# Patient Record
Sex: MALE | Race: WHITE | Employment: FULL TIME | URBAN - METROPOLITAN AREA
[De-identification: names, ages, dates, MRNs, and addresses within clinical notes are randomized per-mention and may not be internally consistent; named-entity substitution may affect disease eponyms.]

---

## 2017-04-06 ENCOUNTER — HOSPITAL ENCOUNTER (OUTPATIENT)
Age: 52
Discharge: HOME | End: 2017-04-06
Payer: COMMERCIAL

## 2017-04-06 VITALS
SYSTOLIC BLOOD PRESSURE: 136 MMHG | OXYGEN SATURATION: 97 % | DIASTOLIC BLOOD PRESSURE: 92 MMHG | HEIGHT: 65 IN | WEIGHT: 175 LBS | BODY MASS INDEX: 29.16 KG/M2 | RESPIRATION RATE: 16 BRPM | HEART RATE: 95 BPM | TEMPERATURE: 98 F

## 2017-04-06 DIAGNOSIS — S61.451A CAT BITE OF HAND, RIGHT, INITIAL ENCOUNTER: Primary | ICD-10-CM
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
## 2017-04-06 PROCEDURE — 99213 OFFICE O/P EST LOW 20 MIN: CPT

## 2017-04-06 PROCEDURE — 90471 IMMUNIZATION ADMIN: CPT

## 2017-04-06 PROCEDURE — 99214 OFFICE O/P EST MOD 30 MIN: CPT

## 2017-04-06 RX ORDER — AMOXICILLIN AND CLAVULANATE POTASSIUM 875; 125 MG/1; MG/1
1 TABLET, FILM COATED ORAL 2 TIMES DAILY
Qty: 20 TABLET | Refills: 0 | Status: SHIPPED | OUTPATIENT
Start: 2017-04-06 | End: 2017-04-16

## 2017-04-06 NOTE — ED PROVIDER NOTES
Patient Seen in: THE [facility]mediate [facility] In [facility] END    History   Patient presents with:  Animal Bite    Stated Complaint: CAT BITE     HPI    Diandra oCsta is a 27-year-old male who presents today for evaluation of Bites to his right hand.   He explains t O2 Device 04/06/17 8226 None (Room air)       Current:/92 mmHg  Pulse 95  Temp(Src) 97.6 °F (36.4 °C) (Temporal)  Resp 16  Ht 165.1 cm (5' 5\")  Wt 79.379 kg  BMI 29.12 kg/m2  SpO2 97%        Physical Exam   Constitutional: He is oriented to person, Plan: The patient be discharged on Augmentin. His Tdap is updated today. He is instructed to continue to monitor the cat that bit him for evidence of rabies, as he is unsure of the vaccination status of the cat.   He should return here or see his primary

## 2017-04-07 NOTE — ED NOTES
Redness  border marked with skin marker. Pt instructed to watch for signs of infection and spreading redness.

## 2017-08-22 ENCOUNTER — OFFICE VISIT (OUTPATIENT)
Dept: INTERNAL MEDICINE CLINIC | Facility: CLINIC | Age: 52
End: 2017-08-22

## 2017-08-22 VITALS
WEIGHT: 185 LBS | TEMPERATURE: 99 F | SYSTOLIC BLOOD PRESSURE: 108 MMHG | RESPIRATION RATE: 21 BRPM | DIASTOLIC BLOOD PRESSURE: 82 MMHG | HEART RATE: 84 BPM | BODY MASS INDEX: 31.58 KG/M2 | HEIGHT: 64 IN

## 2017-08-22 DIAGNOSIS — R07.89 ATYPICAL CHEST PAIN: ICD-10-CM

## 2017-08-22 DIAGNOSIS — Z00.00 PREVENTATIVE HEALTH CARE: Primary | ICD-10-CM

## 2017-08-22 DIAGNOSIS — H91.93 BILATERAL HEARING LOSS, UNSPECIFIED HEARING LOSS TYPE: ICD-10-CM

## 2017-08-22 PROCEDURE — 99396 PREV VISIT EST AGE 40-64: CPT | Performed by: INTERNAL MEDICINE

## 2017-08-22 RX ORDER — TRIAMCINOLONE ACETONIDE 0.25 MG/G
1 CREAM TOPICAL 2 TIMES DAILY
Qty: 15 G | Refills: 3 | Status: SHIPPED | OUTPATIENT
Start: 2017-08-22 | End: 2018-05-23 | Stop reason: ALTCHOICE

## 2017-08-22 NOTE — PATIENT INSTRUCTIONS
- Get fasting blood work done on Saturday. Fast for at least 8 hours, water and medications only. - Follow up with [facility] or [facility]) for colonoscopy, follow up on chest pain.   - Follow up with ENT [facility]) for hearing issue

## 2017-08-22 NOTE — PROGRESS NOTES
Amee Diego is a 50year old male. HPI:   Patient presents with:  CPX  Patient presents for CPX/wellness examination. On his feet and walking all day at work, otherwise not a lot of exercise. Diet is reasonable.   Not eating as much fast louis has never used smokeless tobacco. He reports that he drinks alcohol. He reports that he does not use drugs.   Wt Readings from Last 6 Encounters:  08/22/17 : 185 lb  04/06/17 : 175 lb  12/27/16 : 182 lb  03/21/16 : 182 lb 12 oz  12/10/15 : 175 lb  11/05/15 (Internal Medicine)  The patient indicates understanding of these issues and agrees to the plan. The patient is asked to return to clinic in 6-12 months with Dr. Cyn Ashley MD for follow up on chronic issues, or earlier if acute issues arise.     Yoly LOVE

## 2017-08-26 ENCOUNTER — LAB ENCOUNTER (OUTPATIENT)
Dept: LAB | Age: 52
End: 2017-08-26
Attending: INTERNAL MEDICINE
Payer: COMMERCIAL

## 2017-08-26 DIAGNOSIS — Z00.00 PREVENTATIVE HEALTH CARE: ICD-10-CM

## 2017-08-26 LAB
ALBUMIN SERPL-MCNC: 4 G/DL (ref 3.5–4.8)
ALP LIVER SERPL-CCNC: 68 U/L (ref 45–117)
ALT SERPL-CCNC: 39 U/L (ref 17–63)
AST SERPL-CCNC: 13 U/L (ref 15–41)
BASOPHILS # BLD AUTO: 0.03 X10(3) UL (ref 0–0.1)
BASOPHILS NFR BLD AUTO: 0.4 %
BILIRUB SERPL-MCNC: 0.5 MG/DL (ref 0.1–2)
BUN BLD-MCNC: 9 MG/DL (ref 8–20)
CALCIUM BLD-MCNC: 9 MG/DL (ref 8.3–10.3)
CHLORIDE: 107 MMOL/L (ref 101–111)
CHOLEST SMN-MCNC: 227 MG/DL (ref ?–200)
CO2: 27 MMOL/L (ref 22–32)
COMPLEXED PSA SERPL-MCNC: 0.35 NG/ML (ref 0.01–4)
CREAT BLD-MCNC: 0.69 MG/DL (ref 0.7–1.3)
EOSINOPHIL # BLD AUTO: 0.29 X10(3) UL (ref 0–0.3)
EOSINOPHIL NFR BLD AUTO: 4 %
ERYTHROCYTE [DISTWIDTH] IN BLOOD BY AUTOMATED COUNT: 12.3 % (ref 11.5–16)
EST. AVERAGE GLUCOSE BLD GHB EST-MCNC: 105 MG/DL (ref 68–126)
GLUCOSE BLD-MCNC: 100 MG/DL (ref 70–99)
HBA1C MFR BLD HPLC: 5.3 % (ref ?–5.7)
HCT VFR BLD AUTO: 46.9 % (ref 37–53)
HDLC SERPL-MCNC: 44 MG/DL (ref 45–?)
HDLC SERPL: 5.16 {RATIO} (ref ?–4.97)
HGB BLD-MCNC: 15.7 G/DL (ref 13–17)
IMMATURE GRANULOCYTE COUNT: 0.03 X10(3) UL (ref 0–1)
IMMATURE GRANULOCYTE RATIO %: 0.4 %
LDLC SERPL CALC-MCNC: 161 MG/DL (ref ?–130)
LDLC SERPL-MCNC: 22 MG/DL (ref 5–40)
LYMPHOCYTES # BLD AUTO: 1.76 X10(3) UL (ref 0.9–4)
LYMPHOCYTES NFR BLD AUTO: 24 %
M PROTEIN MFR SERPL ELPH: 7.2 G/DL (ref 6.1–8.3)
MCH RBC QN AUTO: 29.8 PG (ref 27–33.2)
MCHC RBC AUTO-ENTMCNC: 33.5 G/DL (ref 31–37)
MCV RBC AUTO: 89 FL (ref 80–99)
MONOCYTES # BLD AUTO: 0.45 X10(3) UL (ref 0.1–0.6)
MONOCYTES NFR BLD AUTO: 6.1 %
NEUTROPHIL ABS PRELIM: 4.77 X10 (3) UL (ref 1.3–6.7)
NEUTROPHILS # BLD AUTO: 4.77 X10(3) UL (ref 1.3–6.7)
NEUTROPHILS NFR BLD AUTO: 65.1 %
NONHDLC SERPL-MCNC: 183 MG/DL (ref ?–130)
PLATELET # BLD AUTO: 187 10(3)UL (ref 150–450)
POTASSIUM SERPL-SCNC: 3.9 MMOL/L (ref 3.6–5.1)
RBC # BLD AUTO: 5.27 X10(6)UL (ref 4.3–5.7)
RED CELL DISTRIBUTION WIDTH-SD: 39.9 FL (ref 35.1–46.3)
SODIUM SERPL-SCNC: 141 MMOL/L (ref 136–144)
TRIGLYCERIDES: 112 MG/DL (ref ?–150)
WBC # BLD AUTO: 7.3 X10(3) UL (ref 4–13)

## 2017-08-26 PROCEDURE — 36415 COLL VENOUS BLD VENIPUNCTURE: CPT

## 2017-08-26 PROCEDURE — 83036 HEMOGLOBIN GLYCOSYLATED A1C: CPT

## 2017-08-26 PROCEDURE — 85025 COMPLETE CBC W/AUTO DIFF WBC: CPT

## 2017-08-26 PROCEDURE — 80053 COMPREHEN METABOLIC PANEL: CPT

## 2017-08-26 PROCEDURE — 80061 LIPID PANEL: CPT

## 2018-02-19 ENCOUNTER — OFFICE VISIT (OUTPATIENT)
Dept: INTERNAL MEDICINE CLINIC | Facility: CLINIC | Age: 53
End: 2018-02-19

## 2018-02-19 VITALS
DIASTOLIC BLOOD PRESSURE: 76 MMHG | WEIGHT: 190.5 LBS | BODY MASS INDEX: 32.52 KG/M2 | TEMPERATURE: 98 F | HEIGHT: 64.25 IN | HEART RATE: 80 BPM | SYSTOLIC BLOOD PRESSURE: 110 MMHG | RESPIRATION RATE: 17 BRPM

## 2018-02-19 DIAGNOSIS — R22.1 LOCALIZED SWELLING, MASS AND LUMP, NECK: Primary | ICD-10-CM

## 2018-02-19 PROCEDURE — 99214 OFFICE O/P EST MOD 30 MIN: CPT | Performed by: INTERNAL MEDICINE

## 2018-02-19 NOTE — PROGRESS NOTES
Wilber Umaña is a 50year old male. HPI:   Patient presents with:  Lump: lump R side on neck no pain   Patient presents with a lump on the right side of his neck. Started last week. Felt it when stretching. No recent URI symptoms.    No feve Location: Left arm, Patient Position: Sitting, Cuff Size: adult)   Pulse 80   Temp 98 °F (36.7 °C) (Oral)   Resp 17   Ht 64.25\"   Wt 190 lb 8 oz   BMI 32.45 kg/m²   GENERAL: Alert and oriented, well developed, well nourished,in no apparent distress  HEENT

## 2018-02-20 NOTE — PATIENT INSTRUCTIONS
- Watch your neck for the next week  - If the lump/swelling is not better within 1 week, schedule an appointment with [facility]. It was a pleasure seeing you in the clinic today.   Thank you for choosing the [facility] office f

## 2018-05-23 ENCOUNTER — HOSPITAL ENCOUNTER (OUTPATIENT)
Age: 53
Discharge: HOME | End: 2018-05-23
Attending: FAMILY MEDICINE
Payer: COMMERCIAL

## 2018-05-23 VITALS
OXYGEN SATURATION: 96 % | TEMPERATURE: 99 F | DIASTOLIC BLOOD PRESSURE: 82 MMHG | SYSTOLIC BLOOD PRESSURE: 131 MMHG | HEART RATE: 95 BPM | RESPIRATION RATE: 16 BRPM

## 2018-05-23 DIAGNOSIS — J30.2 SEASONAL ALLERGIC RHINITIS, UNSPECIFIED TRIGGER: Primary | ICD-10-CM

## 2018-05-23 PROCEDURE — 99214 OFFICE O/P EST MOD 30 MIN: CPT

## 2018-05-23 PROCEDURE — 99213 OFFICE O/P EST LOW 20 MIN: CPT

## 2018-05-23 RX ORDER — PREDNISONE 20 MG/1
TABLET ORAL
Qty: 10 TABLET | Refills: 0 | Status: SHIPPED | OUTPATIENT
Start: 2018-05-23 | End: 2018-07-03 | Stop reason: ALTCHOICE

## 2018-05-23 RX ORDER — FLUTICASONE PROPIONATE 50 MCG
SPRAY, SUSPENSION (ML) NASAL
Qty: 1 INHALER | Refills: 0 | Status: SHIPPED | OUTPATIENT
Start: 2018-05-23 | End: 2018-07-03 | Stop reason: ALTCHOICE

## 2018-05-23 NOTE — ED INITIAL ASSESSMENT (HPI)
c/o sinus congestion, cough and chest pressure for couple of days, this morning coughing-up greenish mucous. Denies fever. Taking Dayquil no relief.

## 2018-05-23 NOTE — ED PROVIDER NOTES
Patient Seen in: [facility]    History   Patient presents with:  Sinus Problem  Cough/URI    Stated Complaint: chest feels tight, congestion, sinus    HPI    This 27-year-old male presents to the office with 3 day history of sin anicteric,  conjunctiva normal.  EARS: Tympanic membranes normal, EAC's normal.  NOSE: Turbinates congestede, no bleeding noted.   PHARYNX: Cobblestoning is noted, post nasal drip is noted, no exudates seen, airway patent, uvula midline  NECK:  No cervical Use the Flonase 2 sprays each nostril once daily after shower for at least the next 2 weeks or as long as you are congested.   Start the prednisone tomorrow morning with breakfast.  Take prednisone 20 mg tablets 2 tablets once daily with breakfast for 5 day

## 2018-07-03 ENCOUNTER — OFFICE VISIT (OUTPATIENT)
Dept: INTERNAL MEDICINE CLINIC | Facility: CLINIC | Age: 53
End: 2018-07-03

## 2018-07-03 VITALS
SYSTOLIC BLOOD PRESSURE: 108 MMHG | RESPIRATION RATE: 16 BRPM | DIASTOLIC BLOOD PRESSURE: 78 MMHG | BODY MASS INDEX: 31.49 KG/M2 | TEMPERATURE: 98 F | HEART RATE: 72 BPM | WEIGHT: 189 LBS | HEIGHT: 65 IN

## 2018-07-03 DIAGNOSIS — G89.29 CHRONIC NONINTRACTABLE HEADACHE, UNSPECIFIED HEADACHE TYPE: Primary | ICD-10-CM

## 2018-07-03 DIAGNOSIS — R51.9 CHRONIC NONINTRACTABLE HEADACHE, UNSPECIFIED HEADACHE TYPE: Primary | ICD-10-CM

## 2018-07-03 PROBLEM — R07.89 ATYPICAL CHEST PAIN: Status: RESOLVED | Noted: 2017-08-22 | Resolved: 2018-07-03

## 2018-07-03 PROCEDURE — 99214 OFFICE O/P EST MOD 30 MIN: CPT | Performed by: INTERNAL MEDICINE

## 2018-07-03 RX ORDER — SUMATRIPTAN 25 MG/1
25 TABLET, FILM COATED ORAL EVERY 2 HOUR PRN
Qty: 10 TABLET | Refills: 0 | Status: SHIPPED | OUTPATIENT
Start: 2018-07-03 | End: 2021-06-11

## 2018-07-03 NOTE — PROGRESS NOTES
Patient presents with:  Migraine: onset 5 years ago; frequency = 1x/month; quite severe; needs multiple rounds of NSAIDs + it takes hours for it to go away      HPI: The pt presents for migraine evaluation.   Onset/Duration = 5 years ago  Location = occipit imaging and start medication for PRN use. 2. RTC at next regularly scheduled appt. Patient verbally agrees to and understands the plan as outlined above.   Patient was also afforded the time and opportunity to ask questions, which were then answered to th

## 2018-07-04 NOTE — ED NOTES
Call placed to pt, notified pt of recall of fluticasone nasal spray. Pt states he has already been notified. He already checked the lot number and it was not his spray, but he threw it away anyway. No further concerns.

## 2018-07-10 ENCOUNTER — HOSPITAL ENCOUNTER (OUTPATIENT)
Dept: CT IMAGING | Age: 53
Discharge: HOME | End: 2018-07-10
Attending: INTERNAL MEDICINE
Payer: COMMERCIAL

## 2018-07-10 DIAGNOSIS — R51.9 CHRONIC NONINTRACTABLE HEADACHE, UNSPECIFIED HEADACHE TYPE: ICD-10-CM

## 2018-07-10 DIAGNOSIS — G89.29 CHRONIC NONINTRACTABLE HEADACHE, UNSPECIFIED HEADACHE TYPE: ICD-10-CM

## 2018-07-10 PROCEDURE — 70450 CT HEAD/BRAIN W/O DYE: CPT | Performed by: INTERNAL MEDICINE

## 2018-07-31 ENCOUNTER — OFFICE VISIT (OUTPATIENT)
Dept: INTERNAL MEDICINE CLINIC | Facility: CLINIC | Age: 53
End: 2018-07-31
Payer: COMMERCIAL

## 2018-07-31 VITALS
SYSTOLIC BLOOD PRESSURE: 114 MMHG | RESPIRATION RATE: 16 BRPM | DIASTOLIC BLOOD PRESSURE: 86 MMHG | WEIGHT: 187 LBS | HEIGHT: 64 IN | HEART RATE: 86 BPM | BODY MASS INDEX: 31.92 KG/M2 | TEMPERATURE: 98 F | OXYGEN SATURATION: 98 %

## 2018-07-31 DIAGNOSIS — Z00.00 ROUTINE GENERAL MEDICAL EXAMINATION AT A HEALTH CARE FACILITY: Primary | ICD-10-CM

## 2018-07-31 PROBLEM — G43.709 CHRONIC MIGRAINE WITHOUT AURA WITHOUT STATUS MIGRAINOSUS, NOT INTRACTABLE: Status: ACTIVE | Noted: 2018-07-31

## 2018-07-31 PROBLEM — R52 PAIN OF MULTIPLE SITES: Status: ACTIVE | Noted: 2018-07-31

## 2018-07-31 PROBLEM — E66.811 CLASS 1 OBESITY DUE TO EXCESS CALORIES WITHOUT SERIOUS COMORBIDITY WITH BODY MASS INDEX (BMI) OF 32.0 TO 32.9 IN ADULT: Status: ACTIVE | Noted: 2018-07-31

## 2018-07-31 PROBLEM — E66.09 CLASS 1 OBESITY DUE TO EXCESS CALORIES WITHOUT SERIOUS COMORBIDITY WITH BODY MASS INDEX (BMI) OF 32.0 TO 32.9 IN ADULT: Status: ACTIVE | Noted: 2018-07-31

## 2018-07-31 LAB
ALBUMIN SERPL-MCNC: 4.1 G/DL (ref 3.5–4.8)
ALBUMIN/GLOB SERPL: 1.2 {RATIO} (ref 1–2)
ALP LIVER SERPL-CCNC: 78 U/L (ref 45–117)
ALT SERPL-CCNC: 41 U/L (ref 17–63)
ANION GAP SERPL CALC-SCNC: 7 MMOL/L (ref 0–18)
AST SERPL-CCNC: 15 U/L (ref 15–41)
BASOPHILS # BLD AUTO: 0.04 X10(3) UL (ref 0–0.1)
BASOPHILS NFR BLD AUTO: 0.5 %
BILIRUB SERPL-MCNC: 0.8 MG/DL (ref 0.1–2)
BILIRUB UR QL STRIP.AUTO: NEGATIVE
BUN BLD-MCNC: 10 MG/DL (ref 8–20)
BUN/CREAT SERPL: 12.8 (ref 10–20)
CALCIUM BLD-MCNC: 8.5 MG/DL (ref 8.3–10.3)
CHLORIDE SERPL-SCNC: 104 MMOL/L (ref 101–111)
CHOLEST SMN-MCNC: 197 MG/DL (ref ?–200)
CLARITY UR REFRACT.AUTO: CLEAR
CO2 SERPL-SCNC: 29 MMOL/L (ref 22–32)
COLOR UR AUTO: YELLOW
COMPLEXED PSA SERPL-MCNC: 0.31 NG/ML (ref 0.01–4)
CREAT BLD-MCNC: 0.78 MG/DL (ref 0.7–1.3)
EOSINOPHIL # BLD AUTO: 0.12 X10(3) UL (ref 0–0.3)
EOSINOPHIL NFR BLD AUTO: 1.4 %
ERYTHROCYTE [DISTWIDTH] IN BLOOD BY AUTOMATED COUNT: 12.4 % (ref 11.5–16)
GLOBULIN PLAS-MCNC: 3.3 G/DL (ref 2.5–3.7)
GLUCOSE BLD-MCNC: 80 MG/DL (ref 70–99)
GLUCOSE UR STRIP.AUTO-MCNC: NEGATIVE MG/DL
HCT VFR BLD AUTO: 48.4 % (ref 37–53)
HDLC SERPL-MCNC: 44 MG/DL (ref 40–59)
HGB BLD-MCNC: 16.2 G/DL (ref 13–17)
IMMATURE GRANULOCYTE COUNT: 0.03 X10(3) UL (ref 0–1)
IMMATURE GRANULOCYTE RATIO %: 0.4 %
KETONES UR STRIP.AUTO-MCNC: NEGATIVE MG/DL
LDLC SERPL CALC-MCNC: 127 MG/DL (ref ?–100)
LEUKOCYTE ESTERASE UR QL STRIP.AUTO: NEGATIVE
LYMPHOCYTES # BLD AUTO: 2.18 X10(3) UL (ref 0.9–4)
LYMPHOCYTES NFR BLD AUTO: 26.1 %
M PROTEIN MFR SERPL ELPH: 7.4 G/DL (ref 6.1–8.3)
MCH RBC QN AUTO: 29.7 PG (ref 27–33.2)
MCHC RBC AUTO-ENTMCNC: 33.5 G/DL (ref 31–37)
MCV RBC AUTO: 88.8 FL (ref 80–99)
MONOCYTES # BLD AUTO: 0.48 X10(3) UL (ref 0.1–1)
MONOCYTES NFR BLD AUTO: 5.7 %
NEUTROPHIL ABS PRELIM: 5.51 X10 (3) UL (ref 1.3–6.7)
NEUTROPHILS # BLD AUTO: 5.51 X10(3) UL (ref 1.3–6.7)
NEUTROPHILS NFR BLD AUTO: 65.9 %
NITRITE UR QL STRIP.AUTO: NEGATIVE
NONHDLC SERPL-MCNC: 153 MG/DL (ref ?–130)
OSMOLALITY SERPL CALC.SUM OF ELEC: 288 MOSM/KG (ref 275–295)
PH UR STRIP.AUTO: 6 [PH] (ref 4.5–8)
PLATELET # BLD AUTO: 206 10(3)UL (ref 150–450)
POTASSIUM SERPL-SCNC: 3.9 MMOL/L (ref 3.6–5.1)
PROT UR STRIP.AUTO-MCNC: NEGATIVE MG/DL
RBC # BLD AUTO: 5.45 X10(6)UL (ref 4.3–5.7)
RBC UR QL AUTO: NEGATIVE
RED CELL DISTRIBUTION WIDTH-SD: 40.6 FL (ref 35.1–46.3)
SODIUM SERPL-SCNC: 140 MMOL/L (ref 136–144)
SP GR UR STRIP.AUTO: 1.02 (ref 1–1.03)
TRIGL SERPL-MCNC: 131 MG/DL (ref 30–149)
TSI SER-ACNC: 1.98 MIU/ML (ref 0.35–5.5)
UROBILINOGEN UR STRIP.AUTO-MCNC: <2 MG/DL
VIT D+METAB SERPL-MCNC: 7.8 NG/ML (ref 30–100)
VLDLC SERPL CALC-MCNC: 26 MG/DL (ref 0–30)
WBC # BLD AUTO: 8.4 X10(3) UL (ref 4–13)

## 2018-07-31 PROCEDURE — 81003 URINALYSIS AUTO W/O SCOPE: CPT | Performed by: INTERNAL MEDICINE

## 2018-07-31 PROCEDURE — 80061 LIPID PANEL: CPT | Performed by: INTERNAL MEDICINE

## 2018-07-31 PROCEDURE — 80053 COMPREHEN METABOLIC PANEL: CPT | Performed by: INTERNAL MEDICINE

## 2018-07-31 PROCEDURE — 84443 ASSAY THYROID STIM HORMONE: CPT | Performed by: INTERNAL MEDICINE

## 2018-07-31 PROCEDURE — 82306 VITAMIN D 25 HYDROXY: CPT | Performed by: INTERNAL MEDICINE

## 2018-07-31 PROCEDURE — 85025 COMPLETE CBC W/AUTO DIFF WBC: CPT | Performed by: INTERNAL MEDICINE

## 2018-07-31 PROCEDURE — 83036 HEMOGLOBIN GLYCOSYLATED A1C: CPT | Performed by: INTERNAL MEDICINE

## 2018-07-31 PROCEDURE — 99396 PREV VISIT EST AGE 40-64: CPT | Performed by: INTERNAL MEDICINE

## 2018-07-31 NOTE — PROGRESS NOTES
Patient presents with:  Physical      HPI: The pt presents today for male CPX. Doing well for the most part. Health goals center around longevity, vitality, and healthy living. Review of Systems   Constitutional: Negative. HENT: Negative.     Eyes: 05/20/2008      TDAP                  04/06/2017        PE:  /86   Pulse 86   Temp 98.1 °F (36.7 °C) (Oral)   Resp 16   Ht 64\"   Wt 187 lb   SpO2 98%   BMI 32.10 kg/m²   Wt Readings from Last 6 Encounters:  07/31/18 : 187 lb  07/03/18 : 189 lb  0

## 2018-07-31 NOTE — PATIENT INSTRUCTIONS
Viri Hutchison,     1. Labs already done and I'll get you those results soon. 2. Go see your eye doctor. 3. Keep pushing the lifestyle to drop a few lbs. 4. Otherwise, we'll connect again in 1 year for your follow up.     Kind regards,  Dr. Gilson Jones

## 2018-08-01 LAB
EST. AVERAGE GLUCOSE BLD GHB EST-MCNC: 105 MG/DL (ref 68–126)
HBA1C MFR BLD HPLC: 5.3 % (ref ?–5.7)

## 2018-08-05 DIAGNOSIS — E55.9 VITAMIN D DEFICIENCY: Primary | ICD-10-CM

## 2018-08-05 RX ORDER — ERGOCALCIFEROL 1.25 MG/1
50000 CAPSULE ORAL WEEKLY
Qty: 12 CAPSULE | Refills: 0 | Status: SHIPPED | OUTPATIENT
Start: 2018-08-05 | End: 2019-11-05 | Stop reason: ALTCHOICE

## 2018-08-05 NOTE — PROGRESS NOTES
Patient notified of results via [address] Prescription Vitamin D and plan of care action undertaken. Bebe Gallagher. Rockne Najjar, MD  Diplomate, American Board of Internal Medicine  [facility]  [address] [place] [address],[address], [address], [facility], [address] [place] [address]  T: [phone]

## 2018-08-27 DIAGNOSIS — E55.9 VITAMIN D DEFICIENCY: ICD-10-CM

## 2018-08-27 RX ORDER — ERGOCALCIFEROL 1.25 MG/1
CAPSULE ORAL
Qty: 12 CAPSULE | Refills: 0 | OUTPATIENT
Start: 2018-08-27

## 2018-09-27 ENCOUNTER — TELEPHONE (OUTPATIENT)
Dept: INTERNAL MEDICINE CLINIC | Facility: CLINIC | Age: 53
End: 2018-09-27

## 2018-09-27 DIAGNOSIS — E55.9 VITAMIN D DEFICIENCY: ICD-10-CM

## 2018-09-27 RX ORDER — ERGOCALCIFEROL 1.25 MG/1
50000 CAPSULE ORAL WEEKLY
Qty: 12 CAPSULE | Refills: 0 | Status: CANCELLED | OUTPATIENT
Start: 2018-09-27

## 2018-11-02 ENCOUNTER — APPOINTMENT (OUTPATIENT)
Dept: LAB | Facility: HOSPITAL | Age: 53
End: 2018-11-02
Attending: INTERNAL MEDICINE
Payer: COMMERCIAL

## 2018-11-02 DIAGNOSIS — E55.9 VITAMIN D DEFICIENCY: ICD-10-CM

## 2018-11-02 PROCEDURE — 82306 VITAMIN D 25 HYDROXY: CPT

## 2018-11-02 PROCEDURE — 36415 COLL VENOUS BLD VENIPUNCTURE: CPT

## 2019-04-26 ENCOUNTER — TELEPHONE (OUTPATIENT)
Dept: INTERNAL MEDICINE CLINIC | Facility: CLINIC | Age: 54
End: 2019-04-26

## 2019-04-26 DIAGNOSIS — Z00.00 ROUTINE GENERAL MEDICAL EXAMINATION AT A HEALTH CARE FACILITY: ICD-10-CM

## 2019-04-26 DIAGNOSIS — E55.9 VITAMIN D DEFICIENCY: ICD-10-CM

## 2019-04-26 DIAGNOSIS — E66.09 CLASS 1 OBESITY DUE TO EXCESS CALORIES WITHOUT SERIOUS COMORBIDITY WITH BODY MASS INDEX (BMI) OF 32.0 TO 32.9 IN ADULT: Primary | ICD-10-CM

## 2019-04-26 NOTE — TELEPHONE ENCOUNTER
Patient has an appointment for hs annual physical with Dr Sandra Bautista on 08/06/19. Patient requesting his lab work orders to be inputted in prior to his appointment. Please call pt with order status.

## 2019-05-01 NOTE — TELEPHONE ENCOUNTER
Can get labs done in 3 months, just a few days prior to appointment with me. Orders signed. Kristie Mcneill. Darci Espino MD  Diplomate, American Board of Internal Medicine  [facility]  [address] [facility],[address], [address], [facility], [address] [place] [address]  T: [phone];  Salvadore Skiff

## 2019-11-02 ENCOUNTER — LAB ENCOUNTER (OUTPATIENT)
Dept: LAB | Facility: HOSPITAL | Age: 54
End: 2019-11-02
Attending: INTERNAL MEDICINE
Payer: COMMERCIAL

## 2019-11-02 DIAGNOSIS — Z00.00 ROUTINE GENERAL MEDICAL EXAMINATION AT A HEALTH CARE FACILITY: ICD-10-CM

## 2019-11-02 DIAGNOSIS — E55.9 VITAMIN D DEFICIENCY: ICD-10-CM

## 2019-11-02 DIAGNOSIS — E66.09 CLASS 1 OBESITY DUE TO EXCESS CALORIES WITHOUT SERIOUS COMORBIDITY WITH BODY MASS INDEX (BMI) OF 32.0 TO 32.9 IN ADULT: ICD-10-CM

## 2019-11-02 PROCEDURE — 85025 COMPLETE CBC W/AUTO DIFF WBC: CPT

## 2019-11-02 PROCEDURE — 83036 HEMOGLOBIN GLYCOSYLATED A1C: CPT

## 2019-11-02 PROCEDURE — 36415 COLL VENOUS BLD VENIPUNCTURE: CPT

## 2019-11-02 PROCEDURE — 80061 LIPID PANEL: CPT

## 2019-11-02 PROCEDURE — 80053 COMPREHEN METABOLIC PANEL: CPT

## 2019-11-02 PROCEDURE — 84443 ASSAY THYROID STIM HORMONE: CPT

## 2019-11-02 PROCEDURE — 82306 VITAMIN D 25 HYDROXY: CPT

## 2019-11-05 ENCOUNTER — OFFICE VISIT (OUTPATIENT)
Dept: INTERNAL MEDICINE CLINIC | Facility: CLINIC | Age: 54
End: 2019-11-05
Payer: COMMERCIAL

## 2019-11-05 VITALS
RESPIRATION RATE: 12 BRPM | SYSTOLIC BLOOD PRESSURE: 100 MMHG | HEART RATE: 73 BPM | TEMPERATURE: 99 F | OXYGEN SATURATION: 98 % | DIASTOLIC BLOOD PRESSURE: 70 MMHG | WEIGHT: 165.75 LBS | BODY MASS INDEX: 28.65 KG/M2 | HEIGHT: 63.9 IN

## 2019-11-05 DIAGNOSIS — Z13.6 SCREENING FOR HEART DISEASE: ICD-10-CM

## 2019-11-05 DIAGNOSIS — Z00.00 ROUTINE GENERAL MEDICAL EXAMINATION AT A HEALTH CARE FACILITY: Primary | ICD-10-CM

## 2019-11-05 PROBLEM — E66.811 CLASS 1 OBESITY DUE TO EXCESS CALORIES WITHOUT SERIOUS COMORBIDITY WITH BODY MASS INDEX (BMI) OF 32.0 TO 32.9 IN ADULT: Status: RESOLVED | Noted: 2018-07-31 | Resolved: 2019-11-05

## 2019-11-05 PROBLEM — E66.09 CLASS 1 OBESITY DUE TO EXCESS CALORIES WITHOUT SERIOUS COMORBIDITY WITH BODY MASS INDEX (BMI) OF 32.0 TO 32.9 IN ADULT: Status: RESOLVED | Noted: 2018-07-31 | Resolved: 2019-11-05

## 2019-11-05 PROCEDURE — 99396 PREV VISIT EST AGE 40-64: CPT | Performed by: INTERNAL MEDICINE

## 2019-11-05 RX ORDER — ERGOCALCIFEROL (VITAMIN D2) 10 MCG
1 TABLET ORAL DAILY
COMMUNITY

## 2019-11-05 NOTE — PROGRESS NOTES
Patient presents with:  CPX      HPI: Giovanny Briceño presents today for male CPX. Stable health. Just went for wellness labs (see below). Has been doing amazing lifestyle measures to get fit and lose weight.   Health goals still center around longevity, vitality, SpO2 98%   BMI 28.54 kg/m²   Gen - A&Ox3, NAD  HEENT - PERRL, EOMI, OP is clear; TMs clear B  Neck - supple, no JVD, no thyromegaly  Lymph - no palp LAD  Lungs - CTAB  CV - RRR, nl s1, s2  Abd - soft, NABS, NT, ND  Ext - no c/c/e  Neuro - CNs 2-12 grossly 2.0 mg/dL 0.6   TOTAL PROTEIN      6.4 - 8.2 g/dL 7.2   Albumin      3.4 - 5.0 g/dL 4.1   Globulin      2.8 - 4.4 g/dL 3.1   A/G Ratio      1.0 - 2.0 1.3   Color Urine      Yellow Straw   CLARITY URINE      Clear Clear   Spec Gravity      1.001 - 1.030 1. 0

## 2019-12-19 ENCOUNTER — HOSPITAL ENCOUNTER (OUTPATIENT)
Dept: CT IMAGING | Age: 54
Discharge: HOME | End: 2019-12-19
Attending: INTERNAL MEDICINE

## 2019-12-19 DIAGNOSIS — Z13.9 ENCOUNTER FOR SCREENING: ICD-10-CM

## 2020-01-21 ENCOUNTER — HOSPITAL ENCOUNTER (OUTPATIENT)
Dept: CARDIOLOGY CLINIC | Facility: HOSPITAL | Age: 55
Discharge: HOME | End: 2020-01-21
Attending: INTERNAL MEDICINE

## 2020-01-21 DIAGNOSIS — Z13.9 ENCOUNTER FOR SCREENING: ICD-10-CM

## 2021-02-18 ENCOUNTER — HOSPITAL ENCOUNTER (EMERGENCY)
Facility: HOSPITAL | Age: 56
Discharge: HOME | End: 2021-02-18
Attending: EMERGENCY MEDICINE
Payer: COMMERCIAL

## 2021-02-18 ENCOUNTER — APPOINTMENT (OUTPATIENT)
Dept: CT IMAGING | Facility: HOSPITAL | Age: 56
End: 2021-02-18
Attending: EMERGENCY MEDICINE
Payer: COMMERCIAL

## 2021-02-18 VITALS
TEMPERATURE: 98 F | HEART RATE: 77 BPM | OXYGEN SATURATION: 98 % | SYSTOLIC BLOOD PRESSURE: 116 MMHG | DIASTOLIC BLOOD PRESSURE: 82 MMHG | HEIGHT: 65 IN | BODY MASS INDEX: 28.32 KG/M2 | WEIGHT: 170 LBS | RESPIRATION RATE: 18 BRPM

## 2021-02-18 DIAGNOSIS — N20.1 URETEROLITHIASIS: Primary | ICD-10-CM

## 2021-02-18 LAB
ALBUMIN SERPL-MCNC: 4.3 G/DL (ref 3.4–5)
ALBUMIN/GLOB SERPL: 1.3 {RATIO} (ref 1–2)
ALP LIVER SERPL-CCNC: 76 U/L
ALT SERPL-CCNC: 28 U/L
ANION GAP SERPL CALC-SCNC: 6 MMOL/L (ref 0–18)
AST SERPL-CCNC: 14 U/L (ref 15–37)
BASOPHILS # BLD AUTO: 0.04 X10(3) UL (ref 0–0.2)
BASOPHILS NFR BLD AUTO: 0.3 %
BILIRUB SERPL-MCNC: 0.7 MG/DL (ref 0.1–2)
BILIRUB UR QL STRIP.AUTO: NEGATIVE
BUN BLD-MCNC: 16 MG/DL (ref 7–18)
BUN/CREAT SERPL: 14 (ref 10–20)
CALCIUM BLD-MCNC: 9 MG/DL (ref 8.5–10.1)
CHLORIDE SERPL-SCNC: 105 MMOL/L (ref 98–112)
CO2 SERPL-SCNC: 28 MMOL/L (ref 21–32)
COLOR UR AUTO: YELLOW
CREAT BLD-MCNC: 1.14 MG/DL
DEPRECATED RDW RBC AUTO: 38 FL (ref 35.1–46.3)
EOSINOPHIL # BLD AUTO: 0.08 X10(3) UL (ref 0–0.7)
EOSINOPHIL NFR BLD AUTO: 0.6 %
ERYTHROCYTE [DISTWIDTH] IN BLOOD BY AUTOMATED COUNT: 12 % (ref 11–15)
GLOBULIN PLAS-MCNC: 3.2 G/DL (ref 2.8–4.4)
GLUCOSE BLD-MCNC: 111 MG/DL (ref 70–99)
GLUCOSE UR STRIP.AUTO-MCNC: NEGATIVE MG/DL
HCT VFR BLD AUTO: 47.8 %
HGB BLD-MCNC: 16.7 G/DL
IMM GRANULOCYTES # BLD AUTO: 0.06 X10(3) UL (ref 0–1)
IMM GRANULOCYTES NFR BLD: 0.5 %
KETONES UR STRIP.AUTO-MCNC: NEGATIVE MG/DL
LEUKOCYTE ESTERASE UR QL STRIP.AUTO: NEGATIVE
LYMPHOCYTES # BLD AUTO: 2.04 X10(3) UL (ref 1–4)
LYMPHOCYTES NFR BLD AUTO: 15.4 %
M PROTEIN MFR SERPL ELPH: 7.5 G/DL (ref 6.4–8.2)
MCH RBC QN AUTO: 30.1 PG (ref 26–34)
MCHC RBC AUTO-ENTMCNC: 34.9 G/DL (ref 31–37)
MCV RBC AUTO: 86.1 FL
MONOCYTES # BLD AUTO: 0.69 X10(3) UL (ref 0.1–1)
MONOCYTES NFR BLD AUTO: 5.2 %
NEUTROPHILS # BLD AUTO: 10.33 X10 (3) UL (ref 1.5–7.7)
NEUTROPHILS # BLD AUTO: 10.33 X10(3) UL (ref 1.5–7.7)
NEUTROPHILS NFR BLD AUTO: 78 %
NITRITE UR QL STRIP.AUTO: NEGATIVE
OSMOLALITY SERPL CALC.SUM OF ELEC: 290 MOSM/KG (ref 275–295)
PH UR STRIP.AUTO: 6 [PH] (ref 4.5–8)
PLATELET # BLD AUTO: 225 10(3)UL (ref 150–450)
POTASSIUM SERPL-SCNC: 3.5 MMOL/L (ref 3.5–5.1)
PROT UR STRIP.AUTO-MCNC: NEGATIVE MG/DL
RBC # BLD AUTO: 5.55 X10(6)UL
RBC #/AREA URNS AUTO: >10 /HPF
SODIUM SERPL-SCNC: 139 MMOL/L (ref 136–145)
SP GR UR STRIP.AUTO: 1.02 (ref 1–1.03)
UROBILINOGEN UR STRIP.AUTO-MCNC: <2 MG/DL
WBC # BLD AUTO: 13.2 X10(3) UL (ref 4–11)

## 2021-02-18 PROCEDURE — 96374 THER/PROPH/DIAG INJ IV PUSH: CPT

## 2021-02-18 PROCEDURE — 99284 EMERGENCY DEPT VISIT MOD MDM: CPT

## 2021-02-18 PROCEDURE — 80053 COMPREHEN METABOLIC PANEL: CPT | Performed by: EMERGENCY MEDICINE

## 2021-02-18 PROCEDURE — 74176 CT ABD & PELVIS W/O CONTRAST: CPT | Performed by: EMERGENCY MEDICINE

## 2021-02-18 PROCEDURE — 85025 COMPLETE CBC W/AUTO DIFF WBC: CPT | Performed by: EMERGENCY MEDICINE

## 2021-02-18 PROCEDURE — 96361 HYDRATE IV INFUSION ADD-ON: CPT

## 2021-02-18 PROCEDURE — 81001 URINALYSIS AUTO W/SCOPE: CPT | Performed by: EMERGENCY MEDICINE

## 2021-02-18 RX ORDER — HYDROCODONE BITARTRATE AND ACETAMINOPHEN 5; 325 MG/1; MG/1
1-2 TABLET ORAL EVERY 6 HOURS PRN
Qty: 10 TABLET | Refills: 0 | Status: SHIPPED | OUTPATIENT
Start: 2021-02-18 | End: 2021-02-25

## 2021-02-18 RX ORDER — KETOROLAC TROMETHAMINE 30 MG/ML
15 INJECTION, SOLUTION INTRAMUSCULAR; INTRAVENOUS ONCE
Status: COMPLETED | OUTPATIENT
Start: 2021-02-18 | End: 2021-02-18

## 2021-02-18 NOTE — ED PROVIDER NOTES
Patient Seen in: [facility] Emergency Department      History   Patient presents with:  Abdomen/Flank Pain    Stated Complaint: acute rt flank pain that radiates to rt groin started 1 hour ago.  Pt denies n/v    HPI/Subjective:   HPI    50year-old g rebound. Musculoskeletal: Normal range of motion. General: No tenderness. Skin:     General: Skin is warm and dry. Findings: No rash. Neurological:      Mental Status: He is alert and oriented to person, place, and time.       Motor: No a the kidneys to     below the urinary bladder. 2D rendering are generated on the CT scanner     workstation to localize potential stones in the cranio-caudal plane. Dose     reduction techniques were used.      Dose information is transmitted to the ACR (A details. He feels much better on reexamination. Afebrile no evidence of UTI. Renal function is adequate.   He will be discharged home with urology follow-up                         Disposition and Plan     Clinical Impression:  Ureterolithiasis  (Lina Kelly

## 2021-02-22 ENCOUNTER — NURSE ONLY (OUTPATIENT)
Dept: INTERNAL MEDICINE CLINIC | Facility: CLINIC | Age: 56
End: 2021-02-22

## 2021-02-22 VITALS — WEIGHT: 173 LBS | HEIGHT: 65 IN | BODY MASS INDEX: 28.82 KG/M2

## 2021-02-22 DIAGNOSIS — Z00.00 LABORATORY EXAMINATION ORDERED AS PART OF A ROUTINE GENERAL MEDICAL EXAMINATION: Primary | ICD-10-CM

## 2021-02-22 LAB
AMB EXT COVID-19 IGG: NEGATIVE
AMB EXT CREATININE: 0.78 MG/DL
AMB EXT GLUCOSE: 105 MG/DL
AMB EXT HEMATOCRIT: 48.9
AMB EXT HEMOGLOBIN: 16.7
AMB EXT HGBA1C: 5.1 %
AMB EXT MCV: 87.8
AMB EXT PLATELETS: 205
AMB EXT PSA SCREEN: 0.32 NG/ML
AMB EXT TSH: 1.46 MIU/ML
AMB EXT WBC: 7 X10(3)UL
BILIRUB UR QL STRIP.AUTO: NEGATIVE
CLARITY UR REFRACT.AUTO: CLEAR
COLOR UR AUTO: COLORLESS
GLUCOSE UR STRIP.AUTO-MCNC: NEGATIVE MG/DL
KETONES UR STRIP.AUTO-MCNC: NEGATIVE MG/DL
LEUKOCYTE ESTERASE UR QL STRIP.AUTO: NEGATIVE
NITRITE UR QL STRIP.AUTO: NEGATIVE
PH UR STRIP.AUTO: 7 [PH] (ref 4.5–8)
PROT UR STRIP.AUTO-MCNC: NEGATIVE MG/DL
RBC UR QL AUTO: NEGATIVE
SP GR UR STRIP.AUTO: <1.005 (ref 1–1.03)
UROBILINOGEN UR STRIP.AUTO-MCNC: <2 MG/DL

## 2021-02-22 PROCEDURE — 3008F BODY MASS INDEX DOCD: CPT | Performed by: INTERNAL MEDICINE

## 2021-02-22 PROCEDURE — 81003 URINALYSIS AUTO W/O SCOPE: CPT | Performed by: INTERNAL MEDICINE

## 2021-02-22 PROCEDURE — 92551 PURE TONE HEARING TEST AIR: CPT | Performed by: INTERNAL MEDICINE

## 2021-02-22 PROCEDURE — 93000 ELECTROCARDIOGRAM COMPLETE: CPT | Performed by: INTERNAL MEDICINE

## 2021-02-22 NOTE — PROGRESS NOTES
*BODY COMPOSITION:    YES: x      NO:       REASON TEST NOT PERFORMED:   _____________________________________________________________________________  *VENIPUNCTURE    YES:  x     NO:        REASON VENIPUNCTURE NOT PERFORMED:      LEFT A/C:     LEFT HAND:

## 2021-03-05 ENCOUNTER — MED REC SCAN ONLY (OUTPATIENT)
Dept: INTERNAL MEDICINE CLINIC | Facility: CLINIC | Age: 56
End: 2021-03-05

## 2021-03-12 ENCOUNTER — OFFICE VISIT (OUTPATIENT)
Dept: INTERNAL MEDICINE CLINIC | Facility: CLINIC | Age: 56
End: 2021-03-12
Payer: COMMERCIAL

## 2021-03-12 VITALS
SYSTOLIC BLOOD PRESSURE: 99 MMHG | RESPIRATION RATE: 16 BRPM | OXYGEN SATURATION: 97 % | WEIGHT: 173 LBS | HEIGHT: 65 IN | DIASTOLIC BLOOD PRESSURE: 70 MMHG | HEART RATE: 75 BPM | TEMPERATURE: 98 F | BODY MASS INDEX: 28.82 KG/M2

## 2021-03-12 DIAGNOSIS — E78.00 HYPERCHOLESTEROLEMIA: ICD-10-CM

## 2021-03-12 DIAGNOSIS — Z23 NEED FOR SHINGLES VACCINE: ICD-10-CM

## 2021-03-12 DIAGNOSIS — Z00.00 ROUTINE GENERAL MEDICAL EXAMINATION AT A HEALTH CARE FACILITY: Primary | ICD-10-CM

## 2021-03-12 PROBLEM — E55.9 VITAMIN D DEFICIENCY: Status: RESOLVED | Noted: 2018-08-05 | Resolved: 2021-03-12

## 2021-03-12 PROBLEM — H91.93 BILATERAL HEARING LOSS: Status: RESOLVED | Noted: 2017-08-22 | Resolved: 2021-03-12

## 2021-03-12 PROCEDURE — 90471 IMMUNIZATION ADMIN: CPT | Performed by: INTERNAL MEDICINE

## 2021-03-12 PROCEDURE — 3078F DIAST BP <80 MM HG: CPT | Performed by: INTERNAL MEDICINE

## 2021-03-12 PROCEDURE — 90750 HZV VACC RECOMBINANT IM: CPT | Performed by: INTERNAL MEDICINE

## 2021-03-12 PROCEDURE — 3008F BODY MASS INDEX DOCD: CPT | Performed by: INTERNAL MEDICINE

## 2021-03-12 PROCEDURE — 99396 PREV VISIT EST AGE 40-64: CPT | Performed by: INTERNAL MEDICINE

## 2021-03-12 PROCEDURE — 3074F SYST BP LT 130 MM HG: CPT | Performed by: INTERNAL MEDICINE

## 2021-03-12 RX ORDER — ZINC OXIDE 13 %
CREAM (GRAM) TOPICAL
COMMUNITY
Start: 2020-11-01

## 2021-03-12 NOTE — PROGRESS NOTES
Patient presents with: Well Adult      HPI: Jadon Ambrosio presents today for [facility] Annual Wellness Program. His health goals center around longevity, vitality, and QOL. He went for wellness labs via [facility] dated 2/22/21 (see below).     Review of Syste Resp 16   Ht 5' 5\" (1.651 m)   Wt 173 lb (78.5 kg)   SpO2 97%   BMI 28.79 kg/m²   Wt Readings from Last 6 Encounters:  03/12/21 : 173 lb (78.5 kg)  02/22/21 : 173 lb (78.5 kg)  02/18/21 : 170 lb (77.1 kg)  05/26/20 : 170 lb (77.1 kg)  11/05/19 : 165 lb 12 0.2 - 2.0 mg/dL <2.0   Nitrite Urine      Negative Negative   Leukocyte Esterase Urine      Negative Negative   Microscopic       Microscopic not indicated       Diagnostics:  EKG dated 2/22/21 --> NSR, rate 83, nl intervals/axis, no acute ST changes.  E

## 2021-03-15 ENCOUNTER — PATIENT MESSAGE (OUTPATIENT)
Dept: INTERNAL MEDICINE CLINIC | Facility: CLINIC | Age: 56
End: 2021-03-15

## 2021-03-16 NOTE — TELEPHONE ENCOUNTER
From: Yolande Reece  To: Oneida Vanegas MD  Sent: 3/15/2021 10:10 PM CDT  Subject: Other    Dr. Gwendlyn Jeans,  I am in pain on my right side from this kidney stone and pain medication is not alleviating this.

## 2021-03-17 NOTE — TELEPHONE ENCOUNTER
I spoke w/ Rosalina Mcdonald last night and he was feeling better. Roslyn Celeste. Kaz Conner MD  Diplomate, American Board of Internal Medicine  Member, [address] [place] [address] ([email or web address])  [employer]

## 2021-03-30 ENCOUNTER — TELEPHONE (OUTPATIENT)
Dept: INTERNAL MEDICINE CLINIC | Facility: CLINIC | Age: 56
End: 2021-03-30

## 2021-03-30 NOTE — TELEPHONE ENCOUNTER
Patient has his first shingles shot on 3/12/21. He now has an opportunity to get the fist Moderna shot. Please call patient to let him know the amount of time he needs to wait between shots,.

## 2021-04-09 ENCOUNTER — APPOINTMENT (OUTPATIENT)
Dept: LAB | Facility: HOSPITAL | Age: 56
End: 2021-04-09
Attending: UROLOGY
Payer: COMMERCIAL

## 2021-04-09 ENCOUNTER — OFFICE VISIT (OUTPATIENT)
Dept: SURGERY | Facility: CLINIC | Age: 56
End: 2021-04-09
Payer: COMMERCIAL

## 2021-04-09 VITALS — DIASTOLIC BLOOD PRESSURE: 74 MMHG | SYSTOLIC BLOOD PRESSURE: 112 MMHG | HEART RATE: 66 BPM | TEMPERATURE: 97 F

## 2021-04-09 DIAGNOSIS — N20.0 KIDNEY STONE ON LEFT SIDE: ICD-10-CM

## 2021-04-09 DIAGNOSIS — N20.0 KIDNEY STONE: Primary | ICD-10-CM

## 2021-04-09 PROCEDURE — 99203 OFFICE O/P NEW LOW 30 MIN: CPT | Performed by: UROLOGY

## 2021-04-09 PROCEDURE — 3078F DIAST BP <80 MM HG: CPT | Performed by: UROLOGY

## 2021-04-09 PROCEDURE — 3074F SYST BP LT 130 MM HG: CPT | Performed by: UROLOGY

## 2021-04-09 PROCEDURE — 82365 CALCULUS SPECTROSCOPY: CPT | Performed by: UROLOGY

## 2021-04-09 NOTE — PROGRESS NOTES
Rooming Clinician:     Corrine Marina is a 50year old male.   Patient presents with:  Consult: Brought sample of stones  Kidney Problem    Kidney Stones / Ureteral Stone  Pain: Yes right  Nausea: Yes  Vomiting: Yes  Previous Stones: No    Chemical A History:  Social History    Tobacco Use      Smoking status: Former Smoker        Packs/day: 0.00        Types: Cigarettes      Smokeless tobacco: Never Used      Tobacco comment: quit 14 years ago    Vaping Use      Vaping Use: Never used    Alcohol use: flank pain that radiates to rt groin started 1 hour ago.  Pt denies n/v       TECHNIQUE:  Unenhanced multislice CT scanning from above the kidneys to below the urinary bladder.  2D rendering are generated on the CT scanner workstation to localize potential [place] for [employer]  Continue liberal fluid hydration  Discussed ESWL  KUB in 1 year  DINORAH and PSA on yearly basis    I have explained to the patient my rationale for the treatment of stone disease.   I explained that there are several different

## 2021-04-09 NOTE — PATIENT INSTRUCTIONS
Kidney Stones: Are You at Risk? People who form kidney stones often share certain risk factors. Middle-aged men, for instance, are more likely to form stones than other people. A family history of stones also increases your risk.  Assess your risk factor often stop. But it may come back as the stone continues to pass out of the bladder and through the urethra. The stone may pass in your urine stream in one piece. The size may be 1/16 inch to 1/4 inch (1 mm to 6 mm).  Or, the stone may break up into marcos fr pass. Don't stay in bed unless your pain keeps you from getting up. You may notice a red, pink, or brown color to your urine. This is normal while passing a kidney stone.   · If you develop pain, you may take ibuprofen or naproxen for pain, unless another m calcium-rich and oxalate-rich foods together lowers your risk for stones by binding the minerals in the stomach and intestines before they can reach the kidneys.    · Limit salt intake to 2 grams (1 teaspoon) per day.  High sodium in your diet will increase 100. 4ºF (38ºC) or higher, or as directed by your healthcare provider  · Passage of solid red or brown urine (can't see through it) or urine with lots of blood clots  · Foul-smelling or cloudy urine  · Unable to pass urine for 8 hours and increasing bladder you have chronic liver or kidney disease or ever had a stomach ulcer or digestive bleeding, talk with your healthcare provider before using these medicines. · Collecting the stone.  If you were given a strainer, urinate into a jar then pour the urine throu lowers your risk for stones. This is because eating these foods together binds the minerals in the stomach and intestines before they can reach the kidneys. · Limit salt intake to 2 grams (1 teaspoon) per day.  Extra sodium can cause you to lose more calci medicines  · Repeated vomiting or unable to keep down fluids  · Fever of 100.4ºF (38ºC) or higher, or as directed by your healthcare provider  · Blood clots in urine  · Foul smelling or cloudy urine  · Unable to pass urine for 8 hours or increasing bladder Where stones form  Stones begin in the cup-shaped part of the kidney (calyx). Some stay in the calyx and grow. Others move into the kidney, pelvis, or into the ureter. There they can lodge, block the flow of urine, and cause pain.      Symptoms  Many stones contain more than one chemical.       Your kidneys filter your blood and release chemicals into the urine. If certain chemicals build up in the kidneys, they can form a stone.    Treating your stones  You and your healthcare provider will work together to f your prescribed diet  Your healthcare provider will tell you which foods contain the chemicals you should avoid. Your healthcare provider may also suggest talking to a dietitian. He or she can help you plan meals you’ll enjoy.  These meals won’t put you at

## 2021-05-18 NOTE — ED INITIAL ASSESSMENT (HPI)
Can you amend the 04/29/2021 office note that his sleep habits are getting worse or something to that effect. Right hand- cat bite sustained after midnight. Pt was trying to separate the two of his cats, hand swelling with redness. Denies fever.

## 2021-06-04 ENCOUNTER — HOSPITAL ENCOUNTER (OUTPATIENT)
Age: 56
Discharge: HOME | End: 2021-06-04
Payer: COMMERCIAL

## 2021-06-04 VITALS
HEIGHT: 65 IN | WEIGHT: 170 LBS | SYSTOLIC BLOOD PRESSURE: 118 MMHG | DIASTOLIC BLOOD PRESSURE: 86 MMHG | OXYGEN SATURATION: 98 % | RESPIRATION RATE: 16 BRPM | BODY MASS INDEX: 28.32 KG/M2 | TEMPERATURE: 98 F | HEART RATE: 86 BPM

## 2021-06-04 DIAGNOSIS — M54.16 RIGHT LUMBAR RADICULOPATHY: Primary | ICD-10-CM

## 2021-06-04 PROCEDURE — 99213 OFFICE O/P EST LOW 20 MIN: CPT

## 2021-06-04 RX ORDER — IBUPROFEN 400 MG/1
400 TABLET ORAL EVERY 6 HOURS PRN
COMMUNITY

## 2021-06-04 RX ORDER — PREDNISONE 10 MG/1
TABLET ORAL
Qty: 30 TABLET | Refills: 0 | Status: SHIPPED | OUTPATIENT
Start: 2021-06-04 | End: 2021-06-14

## 2021-06-04 RX ORDER — CYCLOBENZAPRINE HCL 5 MG
TABLET ORAL 3 TIMES DAILY PRN
Qty: 20 TABLET | Refills: 0 | Status: SHIPPED | OUTPATIENT
Start: 2021-06-04

## 2021-06-05 NOTE — ED PROVIDER NOTES
Patient Seen in: [facility]      History   Patient presents with:  Back Pain    Stated Complaint: back pain x 10 days    HPI/Subjective:   HPI    Alexandra Benavides is a 14-year-old male who presents today for evaluation of right-sided low back pain t as noted above.     Physical Exam     ED Triage Vitals [06/04/21 1931]   /86   Pulse 86   Resp 16   Temp 97.6 °F (36.4 °C)   Temp src Temporal   SpO2 98 %   O2 Device None (Room air)       Current:/86   Pulse 86   Temp 97.6 °F (36.4 °C) (Tempora instructions are given and discussed. Discharge medications are discussed. The patient is in good condition throughout the visit today and remains so upon discharge. I discuss the plan of care with the patient, who expresses understanding.   All questions

## 2021-06-05 NOTE — ED INITIAL ASSESSMENT (HPI)
Pt with R lower back pain radiating down R leg x 7-10 days - pt woke up with pain; pain is worse today    Denies fever/vom/hematuria/dysuria/injury

## 2021-06-11 ENCOUNTER — OFFICE VISIT (OUTPATIENT)
Dept: SURGERY | Facility: CLINIC | Age: 56
End: 2021-06-11
Payer: COMMERCIAL

## 2021-06-11 VITALS
SYSTOLIC BLOOD PRESSURE: 120 MMHG | HEIGHT: 65 IN | DIASTOLIC BLOOD PRESSURE: 80 MMHG | WEIGHT: 170 LBS | HEART RATE: 72 BPM | BODY MASS INDEX: 28.32 KG/M2

## 2021-06-11 DIAGNOSIS — M54.41 CHRONIC RIGHT-SIDED LOW BACK PAIN WITH RIGHT-SIDED SCIATICA: Primary | ICD-10-CM

## 2021-06-11 DIAGNOSIS — G89.29 CHRONIC RIGHT-SIDED LOW BACK PAIN WITH RIGHT-SIDED SCIATICA: Primary | ICD-10-CM

## 2021-06-11 DIAGNOSIS — M47.816 LUMBAR SPONDYLOSIS: ICD-10-CM

## 2021-06-11 PROCEDURE — 99204 OFFICE O/P NEW MOD 45 MIN: CPT | Performed by: PHYSICIAN ASSISTANT

## 2021-06-11 PROCEDURE — 3074F SYST BP LT 130 MM HG: CPT | Performed by: PHYSICIAN ASSISTANT

## 2021-06-11 PROCEDURE — 3008F BODY MASS INDEX DOCD: CPT | Performed by: PHYSICIAN ASSISTANT

## 2021-06-11 PROCEDURE — 3079F DIAST BP 80-89 MM HG: CPT | Performed by: PHYSICIAN ASSISTANT

## 2021-06-11 RX ORDER — CHLORAL HYDRATE 500 MG
1000 CAPSULE ORAL DAILY
COMMUNITY

## 2021-06-11 NOTE — PROGRESS NOTES
[facility] Neurosurgery Consultation      HISTORY OF PRESENT ILLNESS:Felipe Camilo is a 50year old RH male here for spinal evaluation. Patient is a history of having back pain on and off for many years. He is a former soldier.  He states he would get ba 1991       FAMILY HISTORY:  family history includes Acute MI in his maternal grandfather; Heart Attack in an other family member. SOCIAL HISTORY:   reports that he quit smoking about 20 years ago. His smoking use included cigarettes.  He smoked 0.00 pac lumbar spine from 2012 show 5 lumbar type vertebra in AP projection. Lateral ejection show spondylosis at L3-4 L4-5 and moderate L5-S1. CT of the abdomen shows his lumbar anatomy from 2020. He has 5 lumbar type vertebra in AP projection.  The lateral pro

## 2021-06-11 NOTE — PATIENT INSTRUCTIONS
Refill policies:    • Allow 2-3 business days for refills; controlled substances may take longer.   • Contact your pharmacy at least 5 days prior to running out of medication and have them send an electronic request or submit request through the “request re Depending on your insurance carrier, approval may take 3-10 days. It is highly recommended patients contact their insurance carrier directly to determine coverage.   If test is done without insurance authorization, patient may be responsible for the entire will let me know we can place him on a second round of steroids and get an MRI of his lumbar spine  -If he goes back to his baseline we will see him back as needed  -Images reviewed his questions were answered

## 2021-06-11 NOTE — PROGRESS NOTES
Patient here for consult, referred by UC for back pain. 10 days prior to UC visit, patient reports pain, numbness, pins and needles sensation to right lower back radiating down leg and into right groin area.     Currently while sitting, patient with shanelle

## 2021-06-18 ENCOUNTER — NURSE ONLY (OUTPATIENT)
Dept: INTERNAL MEDICINE CLINIC | Facility: CLINIC | Age: 56
End: 2021-06-18
Payer: COMMERCIAL

## 2021-06-18 PROCEDURE — 90750 HZV VACC RECOMBINANT IM: CPT | Performed by: INTERNAL MEDICINE

## 2021-06-18 PROCEDURE — 90471 IMMUNIZATION ADMIN: CPT | Performed by: INTERNAL MEDICINE

## 2022-03-18 ENCOUNTER — HOSPITAL ENCOUNTER (OUTPATIENT)
Dept: GENERAL RADIOLOGY | Age: 57
Discharge: HOME | End: 2022-03-18
Attending: UROLOGY
Payer: COMMERCIAL

## 2022-03-18 DIAGNOSIS — N20.0 KIDNEY STONE ON LEFT SIDE: ICD-10-CM

## 2022-03-18 PROCEDURE — 74018 RADEX ABDOMEN 1 VIEW: CPT | Performed by: UROLOGY

## 2022-03-21 ENCOUNTER — TELEPHONE (OUTPATIENT)
Dept: INTERNAL MEDICINE CLINIC | Facility: CLINIC | Age: 57
End: 2022-03-21

## 2022-03-21 NOTE — PROGRESS NOTES
Your recent KUB shows no kidney stones and is normal.  Recommend liberal fluid hydration and follow up in the office as directed.     Sincerely,  Phani Hartley MD

## 2022-06-09 ENCOUNTER — NURSE ONLY (OUTPATIENT)
Dept: INTERNAL MEDICINE CLINIC | Facility: CLINIC | Age: 57
End: 2022-06-09
Payer: COMMERCIAL

## 2022-06-09 ENCOUNTER — OFFICE VISIT (OUTPATIENT)
Dept: INTERNAL MEDICINE CLINIC | Facility: CLINIC | Age: 57
End: 2022-06-09
Payer: COMMERCIAL

## 2022-06-09 VITALS
RESPIRATION RATE: 16 BRPM | DIASTOLIC BLOOD PRESSURE: 72 MMHG | HEIGHT: 64 IN | WEIGHT: 180.31 LBS | SYSTOLIC BLOOD PRESSURE: 110 MMHG | OXYGEN SATURATION: 96 % | TEMPERATURE: 98 F | HEART RATE: 75 BPM | BODY MASS INDEX: 30.78 KG/M2

## 2022-06-09 DIAGNOSIS — Z12.9 SCREENING FOR CANCER: ICD-10-CM

## 2022-06-09 DIAGNOSIS — Z00.00 ROUTINE GENERAL MEDICAL EXAMINATION AT A HEALTH CARE FACILITY: Primary | ICD-10-CM

## 2022-06-09 DIAGNOSIS — Z00.00 LABORATORY EXAMINATION ORDERED AS PART OF A ROUTINE GENERAL MEDICAL EXAMINATION: Primary | ICD-10-CM

## 2022-06-09 DIAGNOSIS — R52 PAIN OF MULTIPLE SITES: ICD-10-CM

## 2022-06-09 DIAGNOSIS — G43.709 CHRONIC MIGRAINE WITHOUT AURA WITHOUT STATUS MIGRAINOSUS, NOT INTRACTABLE: ICD-10-CM

## 2022-06-09 DIAGNOSIS — E66.09 CLASS 1 OBESITY DUE TO EXCESS CALORIES WITHOUT SERIOUS COMORBIDITY WITH BODY MASS INDEX (BMI) OF 30.0 TO 30.9 IN ADULT: ICD-10-CM

## 2022-06-09 DIAGNOSIS — I51.5 CARDIAC CALCIFICATION (HCC): ICD-10-CM

## 2022-06-09 PROBLEM — E66.811 CLASS 1 OBESITY DUE TO EXCESS CALORIES WITHOUT SERIOUS COMORBIDITY WITH BODY MASS INDEX (BMI) OF 30.0 TO 30.9 IN ADULT: Status: ACTIVE | Noted: 2022-06-09

## 2022-06-09 LAB
AMB EXT CHLORIDE: 103
AMB EXT CHOL/HDL RATIO: 4.6
AMB EXT CHOLESTEROL, TOTAL: 221 MG/DL
AMB EXT CMP ALT: 25 U/L
AMB EXT CMP AST: 16 U/L
AMB EXT GLUCOSE: 101 MG/DL
AMB EXT HDL CHOLESTEROL: 48 MG/DL
AMB EXT HEMATOCRIT: 48.6
AMB EXT HEMOGLOBIN: 16.1
AMB EXT HGBA1C: 5.1 %
AMB EXT LDL CHOLESTEROL, DIRECT: 151 MG/DL
AMB EXT MCV: 88.8
AMB EXT NON HDL CHOL: 173 MG/DL
AMB EXT POSTASSIUM: 4.3 MMOL/L
AMB EXT PSA SCREEN: 0.33 NG/ML
AMB EXT SODIUM: 140 MMOL/L
AMB EXT TRIGLYCERIDES: 102 MG/DL
AMB EXT TSH: 1.4 MIU/ML
AMB EXT WBC: 7.1 X10(3)UL
BILIRUB UR QL STRIP.AUTO: NEGATIVE
COLOR UR AUTO: YELLOW
GLUCOSE UR STRIP.AUTO-MCNC: NEGATIVE MG/DL
KETONES UR STRIP.AUTO-MCNC: NEGATIVE MG/DL
LEUKOCYTE ESTERASE UR QL STRIP.AUTO: NEGATIVE
NITRITE UR QL STRIP.AUTO: NEGATIVE
PH UR STRIP.AUTO: 7 [PH] (ref 5–8)
PROT UR STRIP.AUTO-MCNC: NEGATIVE MG/DL
RBC UR QL AUTO: NEGATIVE
SP GR UR STRIP.AUTO: 1.01 (ref 1–1.03)
UROBILINOGEN UR STRIP.AUTO-MCNC: <2 MG/DL

## 2022-06-09 PROCEDURE — 81003 URINALYSIS AUTO W/O SCOPE: CPT | Performed by: INTERNAL MEDICINE

## 2022-06-09 PROCEDURE — 93923 UPR/LXTR ART STDY 3+ LVLS: CPT | Performed by: INTERNAL MEDICINE

## 2022-06-09 PROCEDURE — 92551 PURE TONE HEARING TEST AIR: CPT | Performed by: INTERNAL MEDICINE

## 2022-06-09 PROCEDURE — 99173 VISUAL ACUITY SCREEN: CPT | Performed by: INTERNAL MEDICINE

## 2022-06-09 PROCEDURE — 3074F SYST BP LT 130 MM HG: CPT | Performed by: INTERNAL MEDICINE

## 2022-06-09 PROCEDURE — 3078F DIAST BP <80 MM HG: CPT | Performed by: INTERNAL MEDICINE

## 2022-06-09 PROCEDURE — 99396 PREV VISIT EST AGE 40-64: CPT | Performed by: INTERNAL MEDICINE

## 2022-06-09 PROCEDURE — 93000 ELECTROCARDIOGRAM COMPLETE: CPT | Performed by: INTERNAL MEDICINE

## 2022-06-09 PROCEDURE — 3008F BODY MASS INDEX DOCD: CPT | Performed by: INTERNAL MEDICINE

## 2022-06-09 RX ORDER — CYCLOBENZAPRINE HCL 5 MG
TABLET ORAL 3 TIMES DAILY PRN
Qty: 20 TABLET | Refills: 0 | Status: SHIPPED | OUTPATIENT
Start: 2022-06-09

## 2022-06-09 NOTE — PROGRESS NOTES
*BODY COMPOSITION:    YES:x       NO:       REASON TEST NOT PERFORMED:   _____________________________________________________________________________  *VENIPUNCTURE    YES: x      NO:        REASON VENIPUNCTURE NOT PERFORMED:      LEFT A/C:     LEFT HAND:        RIGHT A/C: x 1 stick landed    RIGHT HAND:   __________________________________________________________________  *VISION    YES:x    NO:    REASON TEST NOT PERFORMED:  ________________________________________________________________________  *ANKLE BRACHIAL INDEX    YES;x      NO:    REASON TEST NOT PERFORMED:   ________________________________________________________________________  *URINE SAMPLE    YES:x    NO:    REASON NOT COLLECTED:

## 2022-06-23 ENCOUNTER — TELEPHONE (OUTPATIENT)
Dept: INTERNAL MEDICINE CLINIC | Facility: CLINIC | Age: 57
End: 2022-06-23

## 2022-06-23 NOTE — TELEPHONE ENCOUNTER
Left a message for patient [facility] Lab results are back.  Would pt like to make a telephone appt with  to review lab results

## 2022-07-01 ENCOUNTER — VIRTUAL PHONE E/M (OUTPATIENT)
Dept: INTERNAL MEDICINE CLINIC | Facility: CLINIC | Age: 57
End: 2022-07-01
Payer: COMMERCIAL

## 2022-07-01 DIAGNOSIS — E78.00 HYPERCHOLESTEROLEMIA: ICD-10-CM

## 2022-07-01 DIAGNOSIS — Z71.2 ENCOUNTER TO DISCUSS TEST RESULTS: Primary | ICD-10-CM

## 2022-07-01 DIAGNOSIS — I51.5 CARDIAC CALCIFICATION (HCC): ICD-10-CM

## 2022-07-05 ENCOUNTER — TELEPHONE (OUTPATIENT)
Dept: INTERNAL MEDICINE CLINIC | Facility: CLINIC | Age: 57
End: 2022-07-05

## 2022-07-05 NOTE — TELEPHONE ENCOUNTER
Dr. Adkins [place] [facility] is deloris. Elham Isaacs. Kermit Dominguez MD  Diplomate, American Board of Internal Medicine  Member, American College of Lifestyle Medicine  Member, American Association for [address], [address] [place] [address],[address], [facility], [address]  [phone] (phone); [phone] (fax)  [email or web address]

## 2022-07-20 RX ORDER — CYCLOBENZAPRINE HCL 5 MG
TABLET ORAL 3 TIMES DAILY PRN
Qty: 20 TABLET | Refills: 0 | Status: SHIPPED | OUTPATIENT
Start: 2022-07-20

## 2022-07-20 NOTE — TELEPHONE ENCOUNTER
Cyclobenzaprine 5 mg 1-2 tab tid prn filled 6/9/22 20 tab with 0 refills     LOV 6/9/22   RTC several weeks for telephone visit to discuss [facility] labs  No upcoming apt on file   Labs 6/9/22

## 2022-08-01 ENCOUNTER — ORDER TRANSCRIPTION (OUTPATIENT)
Dept: ADMINISTRATIVE | Facility: HOSPITAL | Age: 57
End: 2022-08-01

## 2022-08-01 DIAGNOSIS — Z13.6 SCREENING FOR CARDIOVASCULAR CONDITION: Primary | ICD-10-CM

## 2022-08-12 ENCOUNTER — OFFICE VISIT (OUTPATIENT)
Facility: LOCATION | Age: 57
End: 2022-08-12
Payer: COMMERCIAL

## 2022-08-12 DIAGNOSIS — J32.0 SINUSITIS, MAXILLARY, CHRONIC: Primary | ICD-10-CM

## 2022-08-12 PROCEDURE — 99204 OFFICE O/P NEW MOD 45 MIN: CPT | Performed by: OTOLARYNGOLOGY

## 2022-08-12 RX ORDER — CEFUROXIME AXETIL 250 MG/1
250 TABLET ORAL 2 TIMES DAILY
Qty: 20 TABLET | Refills: 0 | Status: SHIPPED | OUTPATIENT
Start: 2022-08-12

## 2022-08-12 RX ORDER — GUAIFENESIN 600 MG
600 TABLET, EXTENDED RELEASE 12 HR ORAL 2 TIMES DAILY
Qty: 60 TABLET | Refills: 0 | Status: SHIPPED | OUTPATIENT
Start: 2022-08-12

## 2022-10-14 ENCOUNTER — HOSPITAL ENCOUNTER (OUTPATIENT)
Dept: CT IMAGING | Facility: HOSPITAL | Age: 57
Discharge: HOME | End: 2022-10-14
Attending: INTERNAL MEDICINE

## 2022-10-14 VITALS — BODY MASS INDEX: 30.73 KG/M2 | WEIGHT: 180 LBS | HEIGHT: 64 IN

## 2022-10-14 DIAGNOSIS — Z13.6 ENCOUNTER FOR SCREENING FOR CARDIOVASCULAR DISORDERS: ICD-10-CM

## 2022-10-24 ENCOUNTER — TELEPHONE (OUTPATIENT)
Dept: INTERNAL MEDICINE CLINIC | Facility: CLINIC | Age: 57
End: 2022-10-24

## 2022-10-27 ENCOUNTER — VIRTUAL PHONE E/M (OUTPATIENT)
Dept: INTERNAL MEDICINE CLINIC | Facility: CLINIC | Age: 57
End: 2022-10-27
Payer: COMMERCIAL

## 2022-10-27 DIAGNOSIS — E78.00 HYPERCHOLESTEROLEMIA: ICD-10-CM

## 2022-10-27 DIAGNOSIS — I51.5 CARDIAC CALCIFICATION (HCC): Primary | ICD-10-CM

## 2022-10-27 PROCEDURE — G2252 BRIEF CHKIN BY MD/QHP, 11-20: HCPCS | Performed by: INTERNAL MEDICINE

## 2022-10-27 RX ORDER — ROSUVASTATIN CALCIUM 10 MG/1
10 TABLET, COATED ORAL NIGHTLY
Qty: 90 TABLET | Refills: 3 | Status: SHIPPED | OUTPATIENT
Start: 2022-10-27

## 2022-11-25 ENCOUNTER — HOSPITAL ENCOUNTER (OUTPATIENT)
Dept: ULTRASOUND IMAGING | Age: 57
Discharge: HOME | End: 2022-11-25
Attending: INTERNAL MEDICINE
Payer: COMMERCIAL

## 2022-11-25 DIAGNOSIS — R16.1 SPLENOMEGALY: ICD-10-CM

## 2022-11-25 PROCEDURE — 76700 US EXAM ABDOM COMPLETE: CPT | Performed by: INTERNAL MEDICINE

## 2023-02-22 NOTE — TELEPHONE ENCOUNTER
Cyclobenzaprine 5 mg 1-2 tab tid prn filled 7/20/22 20 with 0 refills     Phone visit 10/27/22  RTC PRN or at next regularly scheduled OV.   No upcoming apt on file   Labs 6/9/22  SODIUM  mmol/L 140    POTASSIUM  mmol/L 4.3    AST  U/L 16    ALT  U/L 25    CHLORIDE 103

## 2023-02-23 RX ORDER — CYCLOBENZAPRINE HCL 5 MG
TABLET ORAL 3 TIMES DAILY PRN
Qty: 20 TABLET | Refills: 0 | Status: SHIPPED | OUTPATIENT
Start: 2023-02-23

## 2023-05-02 RX ORDER — CYCLOBENZAPRINE HCL 5 MG
TABLET ORAL
Qty: 20 TABLET | Refills: 0 | Status: SHIPPED | OUTPATIENT
Start: 2023-05-02

## 2023-05-24 ENCOUNTER — NURSE ONLY (OUTPATIENT)
Dept: INTERNAL MEDICINE CLINIC | Facility: CLINIC | Age: 58
End: 2023-05-24
Payer: COMMERCIAL

## 2023-05-24 DIAGNOSIS — Z00.00 LABORATORY EXAMINATION ORDERED AS PART OF A ROUTINE GENERAL MEDICAL EXAMINATION: Primary | ICD-10-CM

## 2023-05-24 LAB
BILIRUB UR QL STRIP.AUTO: NEGATIVE
CLARITY UR REFRACT.AUTO: CLEAR
GLUCOSE UR STRIP.AUTO-MCNC: NEGATIVE MG/DL
KETONES UR STRIP.AUTO-MCNC: NEGATIVE MG/DL
LEUKOCYTE ESTERASE UR QL STRIP.AUTO: NEGATIVE
NITRITE UR QL STRIP.AUTO: NEGATIVE
PH UR STRIP.AUTO: 7 [PH] (ref 5–8)
PROT UR STRIP.AUTO-MCNC: NEGATIVE MG/DL
RBC UR QL AUTO: NEGATIVE
SP GR UR STRIP.AUTO: 1.01 (ref 1–1.03)
UROBILINOGEN UR STRIP.AUTO-MCNC: <2 MG/DL

## 2023-05-24 PROCEDURE — 93923 UPR/LXTR ART STDY 3+ LVLS: CPT | Performed by: INTERNAL MEDICINE

## 2023-05-24 PROCEDURE — 92551 PURE TONE HEARING TEST AIR: CPT | Performed by: INTERNAL MEDICINE

## 2023-05-24 PROCEDURE — 81003 URINALYSIS AUTO W/O SCOPE: CPT | Performed by: INTERNAL MEDICINE

## 2023-05-24 PROCEDURE — 99173 VISUAL ACUITY SCREEN: CPT | Performed by: INTERNAL MEDICINE

## 2023-05-24 NOTE — PROGRESS NOTES
VENIPUNCTURE:x left arm 1 stick landed    ADD-ON TEST:    EKG:    SPIROMETRY:    URINE:x      VISION: x [facility] last eye exam 3/2022     Right Eye 20/20      Left Eye 20/20     Both Eyes: 20/20      JOSE CRUZ:      Right Tibial Foot _142_ divided by highest arm 130___ = __1.0_       Right Dorsal Foot ___ divided by highest arm ___ = ___       Left Tibial Foot __144_ divided by highest arm _130__ = __1.1_       Left Dorsal Foot ___ divided by highest arm ___ = ___      ARM:   Right Brachial-130     Left Brachial-124      FOOT:   Right Tibial (Side)-142    Right Dorsal (Top)-      Left Tibial (Side)-144    Left Dorsal (Top)-      Greater than 1.3: results not reliable  1.01 to 1.3: correlated with history, especially if the patient has diabetes  0.97 to 1: normal  0.8 to 0.96: mild ischemia  0.4 to 0.79: moderate to severe ischemia  0.39 or less: severe ischemia; danger of limb loss

## 2023-06-08 ENCOUNTER — OFFICE VISIT (OUTPATIENT)
Dept: INTERNAL MEDICINE CLINIC | Facility: CLINIC | Age: 58
End: 2023-06-08
Payer: COMMERCIAL

## 2023-06-08 ENCOUNTER — NURSE ONLY (OUTPATIENT)
Dept: INTERNAL MEDICINE CLINIC | Facility: CLINIC | Age: 58
End: 2023-06-08
Payer: COMMERCIAL

## 2023-06-08 VITALS
TEMPERATURE: 98 F | WEIGHT: 181.5 LBS | OXYGEN SATURATION: 96 % | BODY MASS INDEX: 30.99 KG/M2 | SYSTOLIC BLOOD PRESSURE: 110 MMHG | HEIGHT: 64 IN | RESPIRATION RATE: 16 BRPM | HEART RATE: 78 BPM | DIASTOLIC BLOOD PRESSURE: 82 MMHG

## 2023-06-08 DIAGNOSIS — Z00.00 ROUTINE GENERAL MEDICAL EXAMINATION AT A HEALTH CARE FACILITY: Primary | ICD-10-CM

## 2023-06-08 DIAGNOSIS — I51.5 CARDIAC CALCIFICATION (HCC): ICD-10-CM

## 2023-06-08 DIAGNOSIS — R52 PAIN OF MULTIPLE SITES: ICD-10-CM

## 2023-06-08 DIAGNOSIS — G43.709 CHRONIC MIGRAINE WITHOUT AURA WITHOUT STATUS MIGRAINOSUS, NOT INTRACTABLE: ICD-10-CM

## 2023-06-08 DIAGNOSIS — E78.00 HYPERCHOLESTEROLEMIA: ICD-10-CM

## 2023-06-08 DIAGNOSIS — E66.09 CLASS 1 OBESITY DUE TO EXCESS CALORIES WITHOUT SERIOUS COMORBIDITY WITH BODY MASS INDEX (BMI) OF 31.0 TO 31.9 IN ADULT: ICD-10-CM

## 2023-06-08 DIAGNOSIS — R29.898 WEAKNESS OF BOTH HANDS: ICD-10-CM

## 2023-06-08 DIAGNOSIS — Z12.9 SCREENING FOR CANCER: ICD-10-CM

## 2023-06-08 PROBLEM — E66.811 CLASS 1 OBESITY DUE TO EXCESS CALORIES WITHOUT SERIOUS COMORBIDITY WITH BODY MASS INDEX (BMI) OF 31.0 TO 31.9 IN ADULT: Status: ACTIVE | Noted: 2022-06-09

## 2023-06-08 LAB
AMB EXT CHLORIDE: 104
AMB EXT CHOL/HDL RATIO: 2.9
AMB EXT CHOLESTEROL, TOTAL: 96 MG/DL
AMB EXT CMP ALT: 31 U/L
AMB EXT CMP AST: 17 U/L
AMB EXT GLUCOSE: 96 MG/DL
AMB EXT HDL CHOLESTEROL: 44 MG/DL
AMB EXT HEMATOCRIT: 46.8
AMB EXT HEMOGLOBIN: 16
AMB EXT HGBA1C: 5.1 %
AMB EXT LDL CHOLESTEROL, DIRECT: 64 MG/DL
AMB EXT MCV: 86.5
AMB EXT NON HDL CHOL: 82 MG/DL
AMB EXT POSTASSIUM: 4.3 MMOL/L
AMB EXT PSA SCREEN: 0.39 NG/ML
AMB EXT SODIUM: 140 MMOL/L
AMB EXT TRIGLYCERIDES: 98 MG/DL
AMB EXT TSH: 1.78 MIU/ML
AMB EXT WBC: 6.4 X10(3)UL
ATRIAL RATE: 74 BPM
P AXIS: 40 DEGREES
P-R INTERVAL: 176 MS
Q-T INTERVAL: 382 MS
QRS DURATION: 100 MS
QTC CALCULATION (BEZET): 424 MS
R AXIS: 35 DEGREES
T AXIS: 30 DEGREES
VENTRICULAR RATE: 74 BPM

## 2023-06-08 PROCEDURE — 3008F BODY MASS INDEX DOCD: CPT | Performed by: INTERNAL MEDICINE

## 2023-06-08 PROCEDURE — 99396 PREV VISIT EST AGE 40-64: CPT | Performed by: INTERNAL MEDICINE

## 2023-06-08 PROCEDURE — 3079F DIAST BP 80-89 MM HG: CPT | Performed by: INTERNAL MEDICINE

## 2023-06-08 PROCEDURE — 3074F SYST BP LT 130 MM HG: CPT | Performed by: INTERNAL MEDICINE

## 2023-06-08 PROCEDURE — 93000 ELECTROCARDIOGRAM COMPLETE: CPT | Performed by: INTERNAL MEDICINE

## 2023-06-08 NOTE — PATIENT INSTRUCTIONS
Deann Lively seeing you today! I'll be reaching back out to you soon with your finalized [facility] report and then issue you the final copy. I'll have you go thru the final copy on your own and then I cierra it with a follow up call to review in more detail. Please be on the lookout for some [employer] document support --> Longevity tips (supplements), multivitamin (plant-based) recommendation, and other random stuff. Read thru that [facility] [address] pamphlet. No need to make any decisions now. Just let me know and we would help take care of things. Lastly, you'll get my self-care tips for a happier, healthier you. I'll send this to your MyChart. Jelly Nicholson. Anna Marie Colby MD  Diplomate, American Board of Internal Medicine  Member, American College of Lifestyle Medicine  Member, [employer]  [address], [address] [place] [address],[address], [place], [address] [place] [address] Rd  [phone] (phone); [phone] (fax)  [email or web address]

## 2023-07-07 ENCOUNTER — PATIENT MESSAGE (OUTPATIENT)
Dept: INTERNAL MEDICINE CLINIC | Facility: CLINIC | Age: 58
End: 2023-07-07

## 2023-07-07 NOTE — TELEPHONE ENCOUNTER
From: Leticia Mendez  To: Desire Blanco MD  Sent: 7/7/2023 3:21 PM CDT  Subject: Test Results     I have received my test results but don't know if they are good or bad.

## 2023-07-13 ENCOUNTER — VIRTUAL PHONE E/M (OUTPATIENT)
Dept: INTERNAL MEDICINE CLINIC | Facility: CLINIC | Age: 58
End: 2023-07-13
Payer: COMMERCIAL

## 2023-07-13 DIAGNOSIS — Z71.2 ENCOUNTER TO DISCUSS TEST RESULTS: Primary | ICD-10-CM

## 2023-07-13 PROCEDURE — G2252 BRIEF CHKIN BY MD/QHP, 11-20: HCPCS | Performed by: INTERNAL MEDICINE

## 2023-07-13 NOTE — PROGRESS NOTES
Virtual Telephone Check-In    Dania Nunn verbally consents to a Virtual/Telephone Check-In visit on 07/13/23. Patient has been referred to the [facility] website at [email or web address] to review the yearly Consent to Treat document. Patient understands and accepts financial responsibility for any deductible, co-insurance and/or co-pays associated with this service. Duration of the service: 19 minutes    Patient presents with:  Test Results: [facility] labs dated 6/8/23     Summary of topics discussed:   HPI: I spoke w/ Margarita Lee via live audio over the telephone to discuss [facility]) labs dated 6/8/23. His results were as follows:    Labs (select via [facility] dated 6/8/23): Myeloperoxidase 257  Hs-CRP 0.8  Total cholesterol 126  HDL-c 44  Triglycerides 98  LDL-c 64  Apolipoprotein B 68  Hemoglobin A1c 5.1%  Estimated Average Glucose 100  TMAO (Trimethylamine N-oxide) 1.9  Vitamin D 42.8  OmegaCheck 5.1  FIB-4 Index 0.90  Glucose (fasting) 96  Calcium 9.5  Sodium 140  Potassium 4.3  BUN 14  Creatinine 0.74  AST 17  ALT 31  eGFR 106  TSH 1.78  PSA 0.389  WBC 6.4  Hemoglobin 16.0  Platelet count 900    Review of Systems   All other systems reviewed and are negative.     Patient Active Problem List:     Pain of multiple sites     Chronic migraine without aura without status migrainosus, not intractable     Cardiac calcification (HCC) - CAC score of 56.51 on 10/20/22 CT Heart Scan protocol     Class 1 obesity due to excess calories without serious comorbidity with body mass index (BMI) of 31.0 to 31.9 in adult     Hypercholesterolemia     Weakness of both hands      Zyban [Bupropion Hcl]      Current Outpatient Medications:     CYCLOBENZAPRINE 5 MG Oral Tab, TAKE 1 TO 2 TABLETS BY MOUTH THREE TIMES DAILY AS NEEDED FOR MUSCLE SPASMS, Disp: 20 tablet, Rfl: 0    rosuvastatin 10 MG Oral Tab, Take 1 tablet (10 mg total) by mouth nightly., Disp: 90 tablet, Rfl: 3    omega-3 fatty acids 1000 MG Oral Cap, Take 1,000 mg by mouth daily. , Disp: , Rfl:     ibuprofen 400 MG Oral Tab, Take 1 tablet (400 mg total) by mouth every 6 (six) hours as needed for Pain., Disp: , Rfl:     Probiotic Product (PROBIOTIC DAILY) Oral Cap, Align probiotic once a day , Disp: , Rfl:     cholecalciferol 125 MCG (5000 UT) Oral Cap, Take 1 capsule (5,000 Units total) by mouth daily. , Disp: , Rfl:     Multiple Vitamin (DAILY VALUE MULTIVITAMIN) Oral Tab, Take 1 tablet by mouth daily. , Disp: , Rfl:     Social History     Socioeconomic History    Marital status:    Tobacco Use    Smoking status: Former     Packs/day: 0.00     Types: Cigarettes     Quit date:      Years since quittin.5    Smokeless tobacco: Never   Vaping Use    Vaping Use: Never used   Substance and Sexual Activity    Alcohol use: Yes     Alcohol/week: 0.0 standard drinks of alcohol     Comment: social    Drug use: No    Sexual activity: Yes     Partners: Female   Other Topics Concern    Caffeine Concern Yes     Comment: 16 oz of coffee per day     Stress Concern Yes    Weight Concern Yes    Special Diet No    Exercise Yes     Comment: 3x/week    Seat Belt Yes       PE:  A&Ox3, NAD  He is not SOB and speaks in full sentences  His mood is appropriate      A/P:  Encounter to discuss test results  (primary encounter diagnosis)    1. Discussion took place. 2. His wellness labs thru [facility] are acceptable. 3. Continue healthy lifestyles. 4. Repeat labs in 1 year. Vimal Akers verbally agrees to and understands the plan as outlined above. He was also afforded the time and opportunity to ask questions, which were then answered to the best of my ability. Terry Rajan.  Dustin Lopez MD  Diplomate, American Board of Internal Medicine  Member, American College of Lifestyle Medicine  Member, American Association for Physician Leadership  [address], [address] [place] [address],[address], [address]  [phone] (phone); [phone] (fax) [email or web address]       The [facility] makes medical notes like these available to patients in the interest of transparency. Please be advised this is a medical document. Medical documents are intended to carry relevant information, facts as evident, and the clinical opinion of the practitioner. The medical note is intended as peer to peer communication and may appear blunt or direct. It is written in medical language and may contain abbreviations or verbiage that are unfamiliar.        Meds & Refills for this Visit:  Requested Prescriptions      No prescriptions requested or ordered in this encounter       Imaging & Consults:  None

## 2023-08-07 DIAGNOSIS — I51.5 CARDIAC CALCIFICATION (HCC): ICD-10-CM

## 2023-08-07 DIAGNOSIS — E78.00 HYPERCHOLESTEROLEMIA: ICD-10-CM

## 2023-08-07 RX ORDER — CYCLOBENZAPRINE HCL 5 MG
TABLET ORAL 3 TIMES DAILY PRN
Qty: 20 TABLET | Refills: 0 | Status: SHIPPED | OUTPATIENT
Start: 2023-08-07

## 2023-08-07 RX ORDER — ROSUVASTATIN CALCIUM 10 MG/1
10 TABLET, COATED ORAL NIGHTLY
Qty: 90 TABLET | Refills: 3 | Status: SHIPPED | OUTPATIENT
Start: 2023-08-07

## 2023-08-07 NOTE — TELEPHONE ENCOUNTER
Cyclobenzaprine 5 mg 1-2 tab three times prn filled 5/2/23 20 with 0 refills     LOV 7/13/23  Repeat labs in 1 year.    NO upcoming apt on file   Labs 6/8/23    SODIUM  mmol/L 140   POTASSIUM  mmol/L 4.3   AST  U/L 17   ALT  U/L 31   CHLORIDE 104

## 2023-09-11 ENCOUNTER — TELEPHONE (OUTPATIENT)
Dept: ORTHOPEDICS CLINIC | Facility: CLINIC | Age: 58
End: 2023-09-11

## 2023-09-11 ENCOUNTER — PATIENT MESSAGE (OUTPATIENT)
Dept: INTERNAL MEDICINE CLINIC | Facility: CLINIC | Age: 58
End: 2023-09-11

## 2023-09-11 DIAGNOSIS — M79.642 BILATERAL HAND PAIN: Primary | ICD-10-CM

## 2023-09-11 DIAGNOSIS — M79.641 BILATERAL HAND PAIN: Primary | ICD-10-CM

## 2023-09-11 NOTE — TELEPHONE ENCOUNTER
Pt here for CPE in June. [facility] given. Note states Dr Bobbi Qureshi to look over [place] requirements regarding testing. Pt to make a follow up appt to discuss hand strength issues    Per Yuliana Yan MA, pt to see Dr Benja Miranda for hand related issues. Yuliana Yan will reach out to pt to schedule.      To Dr Jayme Cantor RN

## 2023-09-11 NOTE — TELEPHONE ENCOUNTER
Clarification that it'll be Dr. Edison Pelaez team from [facility] to assist.       Karma Bar MD  Diplomate, American Board of Internal Medicine  Member, American College of Lifestyle Medicine  Member, American Association for Physician Leadership  [address], [address] [place] [address],[address], [place], [address]  [phone] (phone); [phone] (fax)  [email or web address]

## 2023-09-14 ENCOUNTER — TELEPHONE (OUTPATIENT)
Dept: INTERNAL MEDICINE CLINIC | Facility: CLINIC | Age: 58
End: 2023-09-14

## 2023-10-02 RX ORDER — CYCLOBENZAPRINE HCL 5 MG
TABLET ORAL 3 TIMES DAILY PRN
Qty: 20 TABLET | Refills: 0 | Status: SHIPPED | OUTPATIENT
Start: 2023-10-02

## 2023-10-26 DIAGNOSIS — I51.5 CARDIAC CALCIFICATION (HCC): ICD-10-CM

## 2023-10-26 DIAGNOSIS — E78.00 HYPERCHOLESTEROLEMIA: ICD-10-CM

## 2023-10-27 RX ORDER — ROSUVASTATIN CALCIUM 10 MG/1
10 TABLET, COATED ORAL NIGHTLY
Qty: 90 TABLET | Refills: 3 | Status: SHIPPED | OUTPATIENT
Start: 2023-10-27

## 2023-10-27 RX ORDER — CYCLOBENZAPRINE HCL 5 MG
TABLET ORAL 3 TIMES DAILY PRN
Qty: 20 TABLET | Refills: 0 | Status: SHIPPED | OUTPATIENT
Start: 2023-10-27

## 2023-10-27 NOTE — TELEPHONE ENCOUNTER
VM left for pt regarding requested refill on Flexeril. Is he still having muscle spasms?     Katelin Mirza RN

## 2023-10-27 NOTE — TELEPHONE ENCOUNTER
S/w pt  Pt states he using Flexeril sporadically, mostly at night    Seeing a chiropractor for his back pain and using it as needed.      Osmany Mishra RN

## 2023-12-02 ENCOUNTER — PATIENT MESSAGE (OUTPATIENT)
Dept: INTERNAL MEDICINE CLINIC | Facility: CLINIC | Age: 58
End: 2023-12-02

## 2023-12-04 NOTE — TELEPHONE ENCOUNTER
From: Elham Denny  To: Viann Sensing  Sent: 12/2/2023 7:15 AM CST  Subject: Positive Result     Dr. Kristi Klein,  I tested positive for COVID last night. I also would like to inquire if I've had a flu shot recently.

## 2023-12-04 NOTE — TELEPHONE ENCOUNTER
[employer] message received that pt tested positive for covid on Friday 12/1/23. S/w pt today   Pt has dry cough and body aches. Feels he had a fever on Friday, but none now. No other symptoms. Believes he got it from work.    Daughter is also sick    Isolation precautions reviewed  TE scheduled for Monday 12/11    Pt had flu vaccine 10/2022    Rx pend for Dr Tab May RN

## 2023-12-04 NOTE — TELEPHONE ENCOUNTER
Molnupiravir script sent. Please set up for virtual TE. Jack Silav. Ny Cooper MD  Diplomate, American Board of Internal Medicine  Member, American College of Lifestyle Medicine  Member, American Association for Physician Leadership  [address] [place] [address],  [id number]  [facility], [address], [facility], [address]  [phone] (phone); [phone] (fax)  [email or web address]

## 2023-12-11 ENCOUNTER — VIRTUAL PHONE E/M (OUTPATIENT)
Dept: INTERNAL MEDICINE CLINIC | Facility: CLINIC | Age: 58
End: 2023-12-11
Payer: COMMERCIAL

## 2023-12-11 DIAGNOSIS — U07.1 COVID-19 VIRUS INFECTION: Primary | ICD-10-CM

## 2023-12-11 NOTE — PROGRESS NOTES
The [facility] makes medical notes like these available to patients in the interest of transparency. Please be advised this is a medical document. Medical documents are intended to carry relevant information, facts as evident, and the clinical opinion of the practitioner. The medical note is intended as peer to peer communication and may appear blunt or direct. It is written in medical language and may contain abbreviations or verbiage that are unfamiliar. Virtual Telephone Check-In    Dania Nunn verbally consents to a Virtual/Telephone Check-In visit on 12/11/23. Patient has been referred to the [facility] website at [email or web address] to review the yearly Consent to Treat document. Patient understands and accepts financial responsibility for any deductible, co-insurance and/or co-pays associated with this service. Duration of the service: 8 minutes    Chief Complaint   Patient presents with    Covid     Follow up      Summary of topics discussed:   HPI: I spoke w/ Yaneli via live audio over the telephone to discuss 1 week COVID-19 infection follow up. He notified us of positivity w/ symptoms on 12/4/23, but he actually tested positive on 12/1/23. He was prescribed Molnupiravir on 12/4/23. He tolerated the treatment well. He has recovered about \"half my test and smell. \" Of note, he was not vaccinated with the latest COVID booster. Review of Systems   All other systems reviewed and are negative.     Patient Active Problem List   Diagnosis    Pain of multiple sites    Chronic migraine without aura without status migrainosus, not intractable    Cardiac calcification (HCC) - CAC score of 56.51 on 10/20/22 CT Heart Scan protocol    Class 1 obesity due to excess calories without serious comorbidity with body mass index (BMI) of 31.0 to 31.9 in adult    Hypercholesterolemia    Weakness of both hands       Zyban [bupropion hcl]      Current Outpatient Medications:     rosuvastatin 10 MG Oral Tab, Take 1 tablet (10 mg total) by mouth nightly., Disp: 90 tablet, Rfl: 3    cyclobenzaprine 5 MG Oral Tab, Take 1-2 tablets (5-10 mg total) by mouth 3 (three) times daily as needed for Muscle spasms. , Disp: 20 tablet, Rfl: 0    omega-3 fatty acids 1000 MG Oral Cap, Take 1,000 mg by mouth daily. , Disp: , Rfl:     ibuprofen 400 MG Oral Tab, Take 1 tablet (400 mg total) by mouth every 6 (six) hours as needed for Pain., Disp: , Rfl:     Probiotic Product (PROBIOTIC DAILY) Oral Cap, Align probiotic once a day , Disp: , Rfl:     cholecalciferol 125 MCG (5000 UT) Oral Cap, Take 1 capsule (5,000 Units total) by mouth daily. , Disp: , Rfl:     Multiple Vitamin (DAILY VALUE MULTIVITAMIN) Oral Tab, Take 1 tablet by mouth daily. , Disp: , Rfl:     Social History     Socioeconomic History    Marital status:    Tobacco Use    Smoking status: Former     Packs/day: 0     Types: Cigarettes     Quit date:      Years since quittin.9    Smokeless tobacco: Never   Vaping Use    Vaping Use: Never used   Substance and Sexual Activity    Alcohol use: Yes     Alcohol/week: 0.0 standard drinks of alcohol     Comment: social    Drug use: No    Sexual activity: Yes     Partners: Female   Other Topics Concern    Caffeine Concern Yes     Comment: 16 oz of coffee per day     Stress Concern Yes    Weight Concern Yes    Special Diet No    Exercise Yes     Comment: 3x/week    Seat Belt Yes       PE:  A&Ox3, NAD  He is not SOB and speaks in full sentences  His mood is appropriate      A/P:  Encounter Diagnosis   Name Primary? COVID-19 virus infection Yes       Resolved COVID-19 infection. He's recovering both his taste and smell. RTC PRN or at next regularly scheduled OV. Radha Florentino verbally agrees to and understands the plan as outlined above. He was also afforded the time and opportunity to ask questions, which were then answered to the best of my ability. Hoang Cerrato.  Adriel Balbuena 76, 0[address] [facility] Board of Internal Medicine  Member, [facility] of Lifestyle Medicine  Member, American Association for [employer]  [facility] 93, [address], [address]  [phone] (phone); [phone] (fax)  [email or web address]         Meds & Refills for this Visit:  Requested Prescriptions      No prescriptions requested or ordered in this encounter       Imaging & Consults:  None

## 2023-12-14 ENCOUNTER — PATIENT MESSAGE (OUTPATIENT)
Dept: INTERNAL MEDICINE CLINIC | Facility: CLINIC | Age: 58
End: 2023-12-14

## 2023-12-14 NOTE — TELEPHONE ENCOUNTER
From: Suki Kebede  To: Bharathi Rivera  Sent: 12/14/2023 4:04 PM CST  Subject: Insurance     Dr. Noyola Conception.,  Does your practice accept [place] Sawant, my insurance may be changing?

## 2023-12-21 ENCOUNTER — OFFICE VISIT (OUTPATIENT)
Facility: LOCATION | Age: 58
End: 2023-12-21
Payer: COMMERCIAL

## 2023-12-21 DIAGNOSIS — R09.A2 GLOBUS PHARYNGEUS: ICD-10-CM

## 2023-12-21 DIAGNOSIS — R09.82 POST-NASAL DRAINAGE: Primary | ICD-10-CM

## 2023-12-21 PROCEDURE — 99213 OFFICE O/P EST LOW 20 MIN: CPT | Performed by: OTOLARYNGOLOGY

## 2023-12-21 PROCEDURE — 31575 DIAGNOSTIC LARYNGOSCOPY: CPT | Performed by: OTOLARYNGOLOGY

## 2023-12-21 RX ORDER — IPRATROPIUM BROMIDE 42 UG/1
2 SPRAY, METERED NASAL 2 TIMES DAILY
Qty: 1 EACH | Refills: 5 | Status: SHIPPED | OUTPATIENT
Start: 2023-12-21

## 2023-12-21 NOTE — PROGRESS NOTES
Rita Caballero is a 66year old male. Chief Complaint   Patient presents with    Sinus Problem     HPI:   He has chronic postnasal drip. He will include gag on phlegm. He tried Ceftin and Mucinex last year. He does use Claritin seasonally for allergies. REVIEW OF SYSTEMS:   GENERAL HEALTH: feels well otherwise  GENERAL : denies fever, chills, sweats, weight loss, weight gain  SKIN: denies any unusual skin lesions or rashes  RESPIRATORY: denies shortness of breath with exertion  NEURO: denies headaches    EXAM:   There were no vitals taken for this visit. System Findings Details   Constitutional  Overall appearance - Normal.   Psychiatric  Orientation - Oriented to time, place, person & situation. Appropriate mood and affect. Head/Face  Facial features -- Normal. Skull - Normal.   Eyes  Pupils equal ,round ,react to light and accomidate   Ears, Nose, Throat, Neck  Ears clear nose congestion oropharynx clear neck no masses   Neurological  Memory - Normal. Cranial nerves - Cranial nerves II through XII grossly intact. Lymph Detail  Submental. Submandibular. Anterior cervical. Posterior cervical. Supraclavicular. Flexible Laryngoscopy Procedure Note (08209)    Due to inability for adequate examination of the larynx and need for magnification to perform the examination, endoscopy was performed. Risks and benefits were discussed with patient/family and they have given verbal consent to proceed. Pre-operative Diagnosis:   1. Post-nasal drainage    2. Globus pharyngeus        Post-operative Diagnosis: Same    Procedure: Diagnostic flexible fiberoptic laryngoscopy    Anesthesia: topical lidocaine    Surgeon: Lora Gilliland MD    EBL: 0cc    Procedure Detail & Findings: Base of tongue epiglottis and true vocal cords are normal with normal mobility.   There is some mild pachyderma changes posteriorly    Condition: Stable    Complications: Patient tolerated the procedure well with no immediate complications. José Antonio Ortiz MD    ASSESSMENT AND PLAN:   1. Post-nasal drainage  He will try Atrovent nasal spray. If symptoms persist he will call back and I will do a dedicated CT sinus. 2. Globus pharyngeus  Most likely due to the postnasal drip. The patient indicates understanding of these issues and agrees to the plan. No follow-ups on file.     Denise Agarwal MD  12/21/2023  4:44 PM

## 2024-03-28 ENCOUNTER — TELEPHONE (OUTPATIENT)
Facility: LOCATION | Age: 59
End: 2024-03-28

## 2024-03-28 NOTE — TELEPHONE ENCOUNTER
Pt was instructed to update his symptoms after using the nasal spray and is reporting that his symptoms have not improved.

## 2024-04-17 ENCOUNTER — PATIENT MESSAGE (OUTPATIENT)
Dept: INTERNAL MEDICINE CLINIC | Facility: CLINIC | Age: 59
End: 2024-04-17

## 2024-04-18 NOTE — TELEPHONE ENCOUNTER
From: Felipe Burgess  To: Stephane De Luna  Sent: 4/17/2024 8:38 PM CDT  Subject: Irregular arrhythmia     [facility] and they found I have irregular arrhythmia. They will be placing me on a monitoring device for a few weeks. Have they shared any of my previous blood work, or test results with you?

## 2024-04-19 ENCOUNTER — TELEPHONE (OUTPATIENT)
Dept: INTERNAL MEDICINE CLINIC | Facility: CLINIC | Age: 59
End: 2024-04-19

## 2024-05-13 ENCOUNTER — PATIENT MESSAGE (OUTPATIENT)
Dept: INTERNAL MEDICINE CLINIC | Facility: CLINIC | Age: 59
End: 2024-05-13

## 2024-05-13 NOTE — TELEPHONE ENCOUNTER
From: Felipe Burgess  To: Stephane De Luna  Sent: 5/13/2024 6:42 AM CDT  Subject: Email    I need a secure email so that I may share [facility] results from the [facility], they do not share with outside medical professionals.

## 2024-05-21 ENCOUNTER — MED REC SCAN ONLY (OUTPATIENT)
Dept: INTERNAL MEDICINE CLINIC | Facility: CLINIC | Age: 59
End: 2024-05-21

## 2024-05-21 PROBLEM — G47.33 OSA (OBSTRUCTIVE SLEEP APNEA): Status: ACTIVE | Noted: 2024-05-21

## 2024-05-22 ENCOUNTER — OFFICE VISIT (OUTPATIENT)
Facility: LOCATION | Age: 59
End: 2024-05-22

## 2024-05-22 DIAGNOSIS — J32.0 SINUSITIS, MAXILLARY, CHRONIC: ICD-10-CM

## 2024-05-22 DIAGNOSIS — R09.82 POST-NASAL DRAINAGE: Primary | ICD-10-CM

## 2024-05-22 PROCEDURE — 77011 CT SCAN FOR LOCALIZATION: CPT | Performed by: OTOLARYNGOLOGY

## 2024-05-22 PROCEDURE — 99214 OFFICE O/P EST MOD 30 MIN: CPT | Performed by: OTOLARYNGOLOGY

## 2024-05-22 RX ORDER — CEFADROXIL 500 MG/1
500 CAPSULE ORAL 2 TIMES DAILY
Qty: 20 CAPSULE | Refills: 0 | Status: SHIPPED | OUTPATIENT
Start: 2024-05-22 | End: 2024-06-01

## 2024-05-22 RX ORDER — PREDNISONE 10 MG/1
10 TABLET ORAL DAILY
Qty: 10 TABLET | Refills: 0 | Status: SHIPPED | OUTPATIENT
Start: 2024-05-22

## 2024-05-22 NOTE — PROGRESS NOTES
Felipe Burgess is a 58 year old male. No chief complaint on file.    HPI:   Patient has a history of chronic postnasal drip.  He has tried Atrovent nasal spray without relief.  It tends to be thicker.  He cannot cough it up.  He does believe he has allergies.  He has tried Claritin.    REVIEW OF SYSTEMS:   GENERAL HEALTH: feels well otherwise  GENERAL : denies fever, chills, sweats, weight loss, weight gain  SKIN: denies any unusual skin lesions or rashes  RESPIRATORY: denies shortness of breath with exertion  NEURO: denies headaches    EXAM:   There were no vitals taken for this visit.    System Findings Details   Constitutional  Overall appearance - Normal.   Psychiatric  Orientation - Oriented to time, place, person & situation. Appropriate mood and affect.   Head/Face  Facial features -- Normal. Skull - Normal.   Eyes  Pupils equal ,round ,react to light and accomidate   Ears, Nose, Throat, Neck  Nasal congestion   Neurological  Memory - Normal. Cranial nerves - Cranial nerves II through XII grossly intact.   Lymph Detail  Submental. Submandibular. Anterior cervical. Posterior cervical. Supraclavicular.   Clinical indication: Chronic sinusitis    Exam title: CT guidance stereotactic localization of sinuses    Technique: ASR on mini CAT was used with a sinus CT protocol.  The patient was positioned seated upright with the head aligned and supported with malar retention.  A  film was used to ensure proper targeting.  Volume CT data was acquired.  Multiplanar reconstruction was performed on a viewing work stand to obtain axial and coronal images.  Images were manipulated to adjust contrast for bone window viewing.    Scan time: 20 seconds    Peak voltage: 120 K     Tube current: 48.30 MAS    Comparison to prior CT scans: None    Findings:  Frontal sinuses show mild mucosal thickening.  Maxillary ethmoid sinuses show mild mucosal thickening.  There is a head to mucosal thickening in the sphenoid  sinuses.  Diagnosis:  Mild pansinusitis    ASSESSMENT AND PLAN:   1. Post-nasal drainage  Chronic sinusitis could be playing a role given what I see on CT scan.  He will take a round of antibiotic and prednisone.  He will add saline irrigations and instructions were given.  He will then call me back with an update.  He may require an allergy evaluation.    2. Sinusitis, maxillary, chronic        The patient indicates understanding of these issues and agrees to the plan.    No follow-ups on file.    Serafin Maher MD  5/22/2024  9:10 AM

## 2024-05-28 ENCOUNTER — NURSE ONLY (OUTPATIENT)
Dept: INTERNAL MEDICINE CLINIC | Facility: CLINIC | Age: 59
End: 2024-05-28

## 2024-05-28 DIAGNOSIS — Z00.00 LABORATORY EXAMINATION ORDERED AS PART OF A ROUTINE GENERAL MEDICAL EXAMINATION: Primary | ICD-10-CM

## 2024-05-28 LAB
AMB EXT BILIRUBIN, TOTAL: 0.4 MG/DL
AMB EXT BUN: 11 MG/DL
AMB EXT CALCIUM: 9.4
AMB EXT CARBON DIOXIDE: 25
AMB EXT CHLORIDE: 102
AMB EXT CHOL/HDL RATIO: 2.8
AMB EXT CHOLESTEROL, TOTAL: 130 MG/DL
AMB EXT CMP ALT: 23 U/L (ref 9–46)
AMB EXT CMP AST: 15 U/L (ref 10–35)
AMB EXT CREATININE: 0.81 MG/DL
AMB EXT GLUCOSE: 95 MG/DL
AMB EXT HDL CHOLESTEROL: 46 MG/DL
AMB EXT HEMATOCRIT: 47.5 (ref 38.5–50)
AMB EXT HEMOGLOBIN: 15.6 (ref 13.2–17.1)
AMB EXT HGBA1C: 5.4 %
AMB EXT LDL CHOLESTEROL, DIRECT: 64 MG/DL
AMB EXT MCV: 91.9 (ref 80–100)
AMB EXT NON HDL CHOL: 84 MG/DL
AMB EXT PLATELETS: 195 (ref 140–400)
AMB EXT POSTASSIUM: 3.9 MMOL/L (ref 3.5–5.1)
AMB EXT PSA SCREEN: 0.3 NG/ML
AMB EXT SODIUM: 141 MMOL/L (ref 136–145)
AMB EXT TOTAL PROTEIN: 6.5 (ref 6.1–8)
AMB EXT TRIGLYCERIDES: 118 MG/DL
AMB EXT TSH: 1.51 MIU/ML
AMB EXT WBC: 7 X10(3)UL
BILIRUB UR QL STRIP.AUTO: NEGATIVE
CLARITY UR REFRACT.AUTO: CLEAR
COLOR UR AUTO: COLORLESS
GLUCOSE UR STRIP.AUTO-MCNC: NORMAL MG/DL
KETONES UR STRIP.AUTO-MCNC: NEGATIVE MG/DL
LEUKOCYTE ESTERASE UR QL STRIP.AUTO: NEGATIVE
NITRITE UR QL STRIP.AUTO: NEGATIVE
PH UR STRIP.AUTO: 6 [PH] (ref 5–8)
PROT UR STRIP.AUTO-MCNC: NEGATIVE MG/DL
RBC UR QL AUTO: NEGATIVE
SP GR UR STRIP.AUTO: <1.005 (ref 1–1.03)
UROBILINOGEN UR STRIP.AUTO-MCNC: NORMAL MG/DL

## 2024-05-28 PROCEDURE — 99173 VISUAL ACUITY SCREEN: CPT | Performed by: INTERNAL MEDICINE

## 2024-05-28 PROCEDURE — 81003 URINALYSIS AUTO W/O SCOPE: CPT | Performed by: INTERNAL MEDICINE

## 2024-05-28 PROCEDURE — 92551 PURE TONE HEARING TEST AIR: CPT | Performed by: INTERNAL MEDICINE

## 2024-05-28 PROCEDURE — 93923 UPR/LXTR ART STDY 3+ LVLS: CPT | Performed by: INTERNAL MEDICINE

## 2024-05-28 NOTE — PROGRESS NOTES
VENIPUNCTURE: rt arm 1 stick landed    ADD-ON TEST:    EKG:    SPIROMETRY:    URINE: Yes      VISION: [employer] [employer] 2024     Right Eye 20/20      Left Eye 20/20     Both Eyes: 20/20      JOSE CRUZ:      Right Tibial Foot _160__ divided by highest arm _132__ = _1.2__       Right Dorsal Foot ___ divided by highest arm ___ = ___       Left Tibial Foot _154__ divided by highest arm _132__ = __1.1_       Left Dorsal Foot ___ divided by highest arm ___ = ___      ARM:   Right Brachial-132     Left Brachial-124      FOOT:   Right Tibial (Side)-160    Right Dorsal (Top)-      Left Tibial (Side)-154    Left Dorsal (Top)-      Greater than 1.3: results not reliable  1.01 to 1.3: correlated with history, especially if the patient has diabetes  0.97 to 1: normal  0.8 to 0.96: mild ischemia  0.4 to 0.79: moderate to severe ischemia  0.39 or less: severe ischemia; danger of limb loss

## 2024-06-20 ENCOUNTER — OFFICE VISIT (OUTPATIENT)
Dept: INTERNAL MEDICINE CLINIC | Facility: CLINIC | Age: 59
End: 2024-06-20

## 2024-06-20 VITALS
DIASTOLIC BLOOD PRESSURE: 70 MMHG | BODY MASS INDEX: 31.07 KG/M2 | TEMPERATURE: 98 F | OXYGEN SATURATION: 96 % | HEIGHT: 64 IN | HEART RATE: 76 BPM | WEIGHT: 182 LBS | SYSTOLIC BLOOD PRESSURE: 130 MMHG

## 2024-06-20 DIAGNOSIS — E66.09 CLASS 1 OBESITY DUE TO EXCESS CALORIES WITHOUT SERIOUS COMORBIDITY WITH BODY MASS INDEX (BMI) OF 31.0 TO 31.9 IN ADULT: ICD-10-CM

## 2024-06-20 DIAGNOSIS — G43.709 CHRONIC MIGRAINE WITHOUT AURA WITHOUT STATUS MIGRAINOSUS, NOT INTRACTABLE: ICD-10-CM

## 2024-06-20 DIAGNOSIS — I51.5 CARDIAC CALCIFICATION (HCC): ICD-10-CM

## 2024-06-20 DIAGNOSIS — R52 PAIN OF MULTIPLE SITES: ICD-10-CM

## 2024-06-20 DIAGNOSIS — E78.00 HYPERCHOLESTEROLEMIA: ICD-10-CM

## 2024-06-20 DIAGNOSIS — Z00.00 ROUTINE GENERAL MEDICAL EXAMINATION AT A HEALTH CARE FACILITY: Primary | ICD-10-CM

## 2024-06-20 DIAGNOSIS — G47.33 OSA (OBSTRUCTIVE SLEEP APNEA): ICD-10-CM

## 2024-06-20 DIAGNOSIS — R29.898 WEAKNESS OF BOTH HANDS: ICD-10-CM

## 2024-06-20 DIAGNOSIS — G57.02 PIRIFORMIS SYNDROME OF LEFT SIDE: ICD-10-CM

## 2024-06-20 LAB
ATRIAL RATE: 75 BPM
P AXIS: 22 DEGREES
P-R INTERVAL: 162 MS
Q-T INTERVAL: 380 MS
QRS DURATION: 86 MS
QTC CALCULATION (BEZET): 424 MS
R AXIS: 15 DEGREES
T AXIS: 28 DEGREES
VENTRICULAR RATE: 75 BPM

## 2024-06-20 PROCEDURE — 3008F BODY MASS INDEX DOCD: CPT | Performed by: INTERNAL MEDICINE

## 2024-06-20 PROCEDURE — 3075F SYST BP GE 130 - 139MM HG: CPT | Performed by: INTERNAL MEDICINE

## 2024-06-20 PROCEDURE — 3078F DIAST BP <80 MM HG: CPT | Performed by: INTERNAL MEDICINE

## 2024-06-20 PROCEDURE — 99396 PREV VISIT EST AGE 40-64: CPT | Performed by: INTERNAL MEDICINE

## 2024-06-20 PROCEDURE — 93000 ELECTROCARDIOGRAM COMPLETE: CPT | Performed by: INTERNAL MEDICINE

## 2024-06-20 RX ORDER — CYCLOBENZAPRINE HCL 5 MG
TABLET ORAL 3 TIMES DAILY PRN
Qty: 20 TABLET | Refills: 0 | Status: SHIPPED | OUTPATIENT
Start: 2024-06-20

## 2024-06-20 NOTE — PROGRESS NOTES
The 21st Century Cures Act makes medical notes like these available to patients in the interest of transparency. Please be advised this is a medical document. Medical documents are intended to carry relevant information, facts as evident, and the clinical opinion of the practitioner. The medical note is intended as peer to peer communication and may appear blunt or direct. It is written in medical language and may contain abbreviations or verbiage that are unfamiliar.           [facility] MEDICINE    Chief Complaint   Patient presents with    Well Adult       HPI: Brian presents today for his Executive Physical & Wellness Exam.  Generally speaking, he remains in good and stable health. His health goals continue to center around broad themes of longevity, vitality, and quality of life. He [facility] wellness [facility] ([facility]) dated 5/28/24 (see below). His chronic medical conditions are as follows:    1. Cardiac calcification - CAC score of 56.51 on 10/20/22 CT Heart Scan protocol. Remains on prescription medication + lifestyle measures. No new issues.  2. Hypercholesterolemia - Just went for LDL-c thru [facility] and value was 64.  3. Class 1 obesity - Trying to improve intensive lifestyle measures.  4. Chronic migraines - Stable and no new issues. On lifestyle measures.  5. Pain of multiple sites - Former . He continues with global support measures.    6. Weakness of both hands - He continues with global support measures.  7. PERRY on CPAP - This diagnosis was made back in April of this year.  He is on CPAP and adjusting to therapy.  No new issues.    Review of Systems   Musculoskeletal:         He reports left-sided buttocks pain with associated tingling that radiates down into the back of the left leg.  This been going on for quite some time now.   All other systems reviewed and are negative.       Patient Active Problem List   Diagnosis    Pain of multiple sites    Chronic migraine without aura  without status migrainosus, not intractable    Cardiac calcification (HCC) - CAC score of 56.51 on 10/20/22 CT Heart Scan protocol    Class 1 obesity due to excess calories without serious comorbidity with body mass index (BMI) of 31.0 to 31.9 in adult    Hypercholesterolemia    Weakness of both hands    PERRY (obstructive sleep apnea) - severe and [facility]       Past Surgical History:   Procedure Laterality Date    Hernia surgery      Vasectomy         Zyban [bupropion hcl]      Current Outpatient Medications:     rosuvastatin 10 MG Oral Tab, Take 1 tablet (10 mg total) by mouth nightly., Disp: 90 tablet, Rfl: 3    cyclobenzaprine 5 MG Oral Tab, Take 1-2 tablets (5-10 mg total) by mouth 3 (three) times daily as needed for Muscle spasms., Disp: 20 tablet, Rfl: 0    omega-3 fatty acids 1000 MG Oral Cap, Take 1,000 mg by mouth daily., Disp: , Rfl:     ibuprofen 400 MG Oral Tab, Take 1 tablet (400 mg total) by mouth every 6 (six) hours as needed for Pain., Disp: , Rfl:     Probiotic Product (PROBIOTIC DAILY) Oral Cap, Align probiotic once a day , Disp: , Rfl:     Multiple Vitamin (DAILY VALUE MULTIVITAMIN) Oral Tab, Take 1 tablet by mouth daily., Disp: , Rfl:     Social History     Socioeconomic History    Marital status:    Tobacco Use    Smoking status: Former     Current packs/day: 0.00     Types: Cigarettes     Quit date:      Years since quittin.4    Smokeless tobacco: Never   Vaping Use    Vaping status: Never Used   Substance and Sexual Activity    Alcohol use: Not Currently    Drug use: No    Sexual activity: Yes     Partners: Female   Other Topics Concern    Caffeine Concern Yes     Comment: 16 oz of coffee per day     Stress Concern Yes    Weight Concern Yes    Special Diet No    Exercise Yes     Comment: 3x/week    Seat Belt Yes       Family History   Problem Relation Age of Onset    Other (Acute MI) Maternal Grandfather     Heart Attack Other         Immunization History   Administered Date(s) Administered    Covid-19 Vaccine Moderna 100 mcg/0.5 ml 04/03/2021, 05/01/2021, 12/04/2021    Covid-19 Vaccine Moderna Bivalent 50mcg/0.5mL 12+ years 10/09/2022    HEP A 01/05/2010, 02/05/2010    HEP B 01/05/2010, 02/05/2010, 07/01/2010    Influenza 10/09/2022    Pfizer Covid-19 Vaccine 30mcg/0.3ml 12yrs+ ([id number]) 12/19/2023    TD 05/20/2008    TDAP 04/06/2017    Zoster Vaccine Recombinant Adjuvanted (Shingrix) 03/12/2021, 06/18/2021         PE:  /70 (BP Location: Right arm, Patient Position: Sitting, Cuff Size: adult)   Pulse 76   Temp 98.1 °F (36.7 °C) (Temporal)   Ht 5' 4\" (1.626 m)   Wt 182 lb (82.6 kg)   SpO2 96%   BMI 31.24 kg/m²    Wt Readings from Last 6 Encounters:   06/20/24 182 lb (82.6 kg)   06/08/23 181 lb 8 oz (82.3 kg)   10/14/22 180 lb (81.6 kg)   06/09/22 180 lb 4.8 oz (81.8 kg)   06/11/21 170 lb (77.1 kg)   06/04/21 170 lb (77.1 kg)   Gen - A&Ox3, NAD, obese  HEENT - PERRL, EOMI, OP is clear; TMs clear B  Neck - supple, no JVD, no thyromegaly, carotids are 2+ and w/o bruits  Lymph - no palp LAD  Lungs - CTAB  CV - RRR, nl s1, s1, no M  Abd - soft, NABS, NT, ND, no HSM, no B CVAT  Ext - no c/c/e  Neuro - CNs 2-12 are grossly intact, no focal deficits, 2+ DTRs  Psych - nl mood/affect      Ankle-Brachial Index (JOSE CRUZ) done on 5/28/24:  Right JOSE CRUZ = 1.2.  Left JOSE CRUZ = 1.1.    Gait Speed Assessment done on 5/28/24:  Time to walk 4 meters = 3 seconds.  Walking device, Y/N? = N.     Strength Assessment with Brian Plus Hand Dynamometer done on 5/28/24:  Dominant Hand, R or L?: R  Left Hand Average: 69.2 lbs.  Right Hand Average: 41.1 lbs.  [Normal Range for Age = 67.5 - 106.7 lbs].     Audiometry done on 5/28/24:  Hearing aids, Y/N? = N.  Left ear: 25 dB Y  Y  Y  Y    40 dB Y  Y  Y  Y     500 Hz  1000 Hz 2000 Hz 4000 Hz  Right ear: 25 dB Y  Y  Y  Y    40 dB Y  Y  Y  Y     500 Hz  1000 Hz 2000 Hz 4000 Hz    Vision Exam done on  5/28/24:  Corrected/Uncorrected? = C.  Right Eye = 20/20.  Left Eye = 20/20.  Both Eyes = 20/20.    Body Composition Analysis via [employer] Professional Machine done on 5/28/24:  Current Body Weight: 182.0 lbs.  Total Body Fat: 26.8 % and 48.2 lbs.  Fat-Free Mass: 73.1 % and 131.6 lbs.  Total Body Water: 54.1 % and 97.4 lbs.  Muscle-Mass: 34.1 lbs.  Body Mass Index (BMI): 31.0 kg/m2.  Basal Metabolic Rate: 1557 Calories/Day.    ZIBRIO BALANCE & FALL RISK ASSESSMENT done on 5/28/24:  You scored a 8 on the ZIBRIO scale.  This is the 90th percentile for your age.  Your fall risk is considered to be low.    Labs (select via [facility] dated 5/28/24):  Myeloperoxidase 254  Lp-PLA2 Activity 86  Hs-CRP 0.4  ADMA 79  SDMA 62  OxLDL 44  Total cholesterol 130  HDL-c 46  Triglycerides 118  LDL-c 64  Apolipoprotein B 79  Hemoglobin A1c 5.4%  Estimated Average Glucose 107  TMAO (Trimethylamine N-oxide) 3.4  Coenzyme Q10 3.53  Vitamin B12 535  Vitamin D 42.3  OmegaCheck 4.7  FIB-4 Index 0.91  Glucose (fasting) 95  Calcium 9.4  Sodium 141  Potassium 3.9  BUN 11  Creatinine 0.81  AST 15  ALT 23  eGFR 103  TSH 1.51  PSA 0.302  WBC 7.0  Hemoglobin 15.6  Platelet count 195    Component      Latest Ref Rng 5/28/2024   Color Urine      Yellow  Colorless !    Clarity Urine      Clear  Clear    Spec Gravity      1.005 - 1.030  <1.005 (L)    Glucose Urine      Normal mg/dL Normal    Bilirubin Urine      Negative  Negative    Ketones, UA      Negative mg/dL Negative    Blood Urine      Negative  Negative    PH Urine      5.0 - 8.0  6.0    Protein Urine      Negative mg/dL Negative    Urobilinogen Urine      Normal mg/dL Normal    Nitrite Urine      Negative  Negative    Leukocyte Esterase       Negative  Negative    Microscopic Microscopic not indicated       Legend:  ! Abnormal  (L) Low    Diagnostics:  EKG done today shows NSR, rate 75, nl intervals/axis, no acute ST changes. EKG is normal.      A/P:  Encounter Diagnoses   Name Primary?     Routine general medical examination at a health care facility Yes    Cardiac calcification (HCC) - CAC score of 56.51 on 10/20/22 CT Heart Scan protocol     Hypercholesterolemia     Class 1 obesity due to excess calories without serious comorbidity with body mass index (BMI) of 31.0 to 31.9 in adult     Chronic migraine without aura without status migrainosus, not intractable     Pain of multiple sites     Weakness of both hands     PERRY (obstructive sleep apnea) - severe and [facility]     Piriformis syndrome of left side      1. Executive Physical & Wellness Exam - He is assessed to be in good and stable health.  Wellness labs as above.  Continue looking at all opportunities for global health improvement.  2. Cardiac calcification - CAC score of 56.51 on 10/20/22 CT Heart Scan protocol. Remains on prescription medication + lifestyle measures. No new issues.  Next heart scan protocol will be in fall 2025.  3. Hypercholesterolemia - Just went for LDL-c thru [facility] and value was 64.  Continue prescription medication as directed.  4. Class 1 obesity - Trying to improve intensive lifestyle measures.  Continue working on them.  5. Chronic migraines - Stable and no new issues. On lifestyle measures.  6. Pain of multiple sites - Former . He continues with global support measures.    7. Weakness of both hands - He continues with global support measures.  Dynamometer testing as above.  8. PERRY on CPAP - This diagnosis was made back in April of this year.  He is on CPAP and adjusting to therapy.  No new issues.  9.  Piriformis syndrome on the left side - This is a new diagnosis based on his review of systems.  Pathophysiology discussed.  Sent to physical therapy.  Information sent to Brian via [employer].  10.  RTC 1 year or as needed.  Additionally, Brian and I discussed several options to improve and deepen his wellness plan including multi cancer early detection testing,  polygenic rescore analysis, DEXA for body composition, and reviewed his vitamins and supplements.  Other strategies were given to him and his folder today.  He verbally agrees to and understands the plan as outlined above. He was also afforded the time and opportunity to ask questions, which were then answered to the best of my ability.        Stepahne De Luna MD  Diplomate, American Board of Internal Medicine  Member, American College of Lifestyle Medicine  Member, American Association for Physician Leadership  [address], [facility] [address] [place] [address]  [phone] (phone); [phone] (fax)  [email or web address]         Meds & Refills for this Visit:  Requested Prescriptions     Signed Prescriptions Disp Refills    cyclobenzaprine 5 MG Oral Tab 20 tablet 0     Sig: Take 1-2 tablets (5-10 mg total) by mouth 3 (three) times daily as needed for Muscle spasms.       Imaging & Consults:  ELECTROCARDIOGRAM, COMPLETE  OP REFERRAL TO [facility]

## 2024-07-01 RX ORDER — CYCLOBENZAPRINE HCL 5 MG
TABLET ORAL 3 TIMES DAILY PRN
Qty: 20 TABLET | Refills: 0 | Status: SHIPPED | OUTPATIENT
Start: 2024-07-01

## 2024-07-01 NOTE — TELEPHONE ENCOUNTER
Medication: Cyclobenzaprine 5 mg 1-2 tab tid prn filled     Last time it was filled 6/20/24 20 with 0 refills   Last office visit:  6/20/24  Due back to office:  RTC 1 year or as needed.   Future office visit:  No upcoming apt on file   Labs   5/28/24    BUN  mg/dL 11   Bilirubin, Total  mg/dL 0.4   SODIUM  136 - 145 mmol/L 141   POTASSIUM  3.5 - 5.1 mmol/L 3.9   AST  10 - 35 U/L 15   ALT  9 - 46 U/L 23

## 2024-07-08 ENCOUNTER — TELEPHONE (OUTPATIENT)
Dept: PHYSICAL THERAPY | Facility: HOSPITAL | Age: 59
End: 2024-07-08

## 2024-07-09 NOTE — PROGRESS NOTES
LOWER EXTREMITY EVALUATION:     Diagnosis:   Piriformis syndrome of left side (G57.02)       Referring Provider: Stephane De Luna  Date of Evaluation:    7/9/2024    Precautions:  None Next MD visit:   none scheduled  Date of Surgery: n/a     PATIENT SUMMARY   Felipe Burgess is a 58 year old male who presents to therapy today with complaints of tingling down the leg on the left side that started about a little over a month ago, the pain has not changed in the last month. He notes that he does have lower back pain and uses tylenol or muscle relaxants. He believes he has DDD but cannot remember where. He mentions that the pain comes and goes, can be sitting or walking when it starts. The most he feels it is when he is out shopping. Has pain on the outside of the leg, no symptoms in the outside of the hip or back of the hip.   Pt describes pain level current 2/10, tingling symptoms 6/10   occupation: sitting, standing, walking.   Current functional limitations include he doesn't feel limited in doing anything but does have to modify walking shopping, sitting.     Felipe describes prior level of function as normal. Pt goals include to be able to complete all ADLs.  Past medical history was reviewed with Felipe. Significant findings include the following:   Past Medical History:    Abdominal hernia    Arthritis    Back pain    Bad breath    Bleeding nose    Dizziness    Fatigue    Flatulence/gas pain/belching    Hearing loss    Itch of skin    Pain in joints    Sleep disturbance    Stress    Wears glasses    Weight gain       Past Surgical History:   Procedure Laterality Date    Hernia surgery      Vasectomy  1991       Recent Imaging: none within 90 days        ASSESSMENT  Felipe presents to physical therapy evaluation with primary c/o LLE N/T that began a month ago with concurrent lower back pain that has been going on for about two years. The results of the objective tests and  measures show reduced lordotic curvature, mild ROM limitations, muscle length deficits, muscle weakness, + Pola's for tightness through IT band, fair squat and single leg squat mechanics and mild gait abnormalities.  Functional deficits include but are not limited to sitting, standing, walking.  Signs and symptoms are consistent with diagnosis of R piriformis syndrome with possible lumbar component to sciatica pain. Pt and PT discussed evaluation findings, pathology, POC and HEP.  Pt voiced understanding and performs HEP correctly without reported pain. Skilled Physical Therapy is medically necessary to address the above impairments and reach functional goals.     OBJECTIVE:   Observation: level ASIS, PSPS, iliac crest, slightly reduced lordotic curve  Palpation: TTP through piriformis and deep hip ERs    Lumbar AROM: (* denotes performed with pain)  Flexion: 100%  Extension: 80%  Sidebending: R 80%; L 80%  Rotation: R 80%; L 100%  **no movements recreate N/T    Flexibility:  Hamstrings:  L limited  Piriformis: L limited    Strength/MMT: (* denotes performed with pain)  Hip Knee Foot/Ankle   Flexion: R 4+/5; L 5/5  Extension: R 5/5; L 4+/5  Abduction: R 4+/5; L 4+/5  ER: R 5/5; L 5/5  IR: R 5/5; L 5/5 Flexion: R 5/5; L 5/5  Extension: R 5/5; L 5/5    DF: R 5/5; L 5/5     Special tests:   + obers   - Cairo's sign    Gait: pt ambulates on level ground with slightly reduced trunk rotation and mild toe out position   Balance: SLS: R WFL, L WFL  Squat: excessive anterior tibial translation with weight shift onto toes, increased lumbar flexion at end range  Single leg squat: mild pelvic drop and medial knee collapse with hip IR bilaterally.     Today’s Treatment and Response:   Pt education was provided on exam findings, treatment diagnosis, treatment plan, expectations, and prognosis. Pt was also provided recommendations for possible soreness after evaluation and modalities as needed [ice/heat].  Patient was instructed  in and issued a HEP for:   Access Code: [email or web address]  URL: [email or web address]  Date: 07/10/2024  Prepared by: Jyoti Andersen    Exercises  - Supine Posterior Pelvic Tilt  - 2 x daily - 7 x weekly - 20 reps - 5\" hold  - Supine March with Posterior Pelvic Tilt  - 2 x daily - 7 x weekly - 20 reps  - Supine Hip External Rotation Stretch  - 2 x daily - 7 x weekly - 15 reps - 3\" hold  - Supine 90/90 Sciatic Nerve Glide with Knee Flexion/Extension  - 2 x daily - 7 x weekly - 30 reps  - Clamshell  - 2 x daily - 7 x weekly - 2 sets - 10 reps  - Seated Piriformis Stretch with Trunk Bend  - 2 x daily - 7 x weekly - 3 sets - 30\" hold    Charges: PT Eval Low Complexity, 25      Total Timed Treatment: 15 min     Total Treatment Time: 40 min     HEP 15 min demo and perform (1 TE)    PLAN OF CARE:    Goals: (to be met in 8 visits)  Pt will improve hip ABD strength to at least 4+/5 to increase ease with standing and walking   Pt will demonstrate improved SL squat mechanics to improve lumbopelvic strength with functional activities   Pt will improve transversus abdominis recruitment to perform proper isometric contraction without requiring verbal or tactile cuing to promote advancement of therex   Pt will demonstrate good understanding of proper posture and body mechanics to decrease pain and improve spinal safety   Pt will report improved symptom centralization and absence of radicular symptoms for 3 consecutive days to improve function with ADL   Pt will have decreased paraspinal mm tension to tolerate standing >60 minutes for work and home activities   Pt will demonstrate improved core strength to be able to perform lifting with <2/10 pain   Pt will be independent and compliant with comprehensive HEP to maintain progress achieved in PT         Frequency / Duration: Patient will be seen for 1-2 x/week or a total of 8 visits over a 90 day period. Treatment will include: Manual Therapy, Neuromuscular Re-education,  Therapeutic Activities, Therapeutic Exercise, and Home Exercise Program instruction and dry needling    Education or treatment limitation: None  Rehab Potential:excellent    LEFS Score  LEFS Score: 80 % (7/4/2024  6:24 PM)      Patient/Family/Caregiver was advised of these findings, precautions, and treatment options and has agreed to actively participate in planning and for this course of care.    Thank you for your referral. Please co-sign or sign and return this letter via fax as soon as possible to [phone]. If you have any questions, please contact me at Dept: [phone]    Sincerely,  Electronically signed by therapist: Jyoti Andersen PT  Physician's certification required: Yes  I certify the need for these services furnished under this plan of treatment and while under my care.    X___________________________________________________ Date____________________    Certification From: 7/9/2024  To:10/7/2024

## 2024-07-10 ENCOUNTER — OFFICE VISIT (OUTPATIENT)
Dept: PHYSICAL THERAPY | Age: 59
End: 2024-07-10
Attending: INTERNAL MEDICINE
Payer: COMMERCIAL

## 2024-07-10 DIAGNOSIS — G57.02 PIRIFORMIS SYNDROME OF LEFT SIDE: Primary | ICD-10-CM

## 2024-07-10 PROCEDURE — 97161 PT EVAL LOW COMPLEX 20 MIN: CPT

## 2024-07-10 PROCEDURE — 97110 THERAPEUTIC EXERCISES: CPT

## 2024-07-16 NOTE — PROGRESS NOTES
Diagnosis:   Piriformis syndrome of left side (G57.02)        Referring Provider: Stephane De Luna  Date of Evaluation:    7/9/2024    Precautions:  None Next MD visit:   none scheduled  Date of Surgery: n/a   Insurance Primary/Secondary: BCBS POS / N/A     # Auth Visits: n/a            Subjective: States that he was out of town in [place] for an [employer] convention and wasn't able to do his exercises every day. Notes the pain is the same.     Pain: 6/10 when present      Objective: Localized twitch response elicited in R glute max, R piriformis       Assessment: Patient displays good understanding and performance of HEP exercises. Myopain triggerpoint dry needling performed to R glute max and R piriformis using 75 mm needle with patient positioned in prone. He was progressed in glute/TA activation exercises to improve lumbopelvic stabilization and reduce overuse of piriformis.        Goals:   (to be met in 8 visits)  Pt will improve hip ABD strength to at least 4+/5 to increase ease with standing and walking   Pt will demonstrate improved SL squat mechanics to improve lumbopelvic strength with functional activities   Pt will improve transversus abdominis recruitment to perform proper isometric contraction without requiring verbal or tactile cuing to promote advancement of therex   Pt will demonstrate good understanding of proper posture and body mechanics to decrease pain and improve spinal safety   Pt will report improved symptom centralization and absence of radicular symptoms for 3 consecutive days to improve function with ADL   Pt will have decreased paraspinal mm tension to tolerate standing >60 minutes for work and home activities   Pt will demonstrate improved core strength to be able to perform lifting with <2/10 pain   Pt will be independent and compliant with comprehensive HEP to maintain progress achieved in PT     Plan: Continue as able   Date: 7/17/2024  TX#: 2/8 Date:                 TX#: 3/  Date:                 TX#: 4/ Date:                 TX#: 5/ Date:   Tx#: 6/   Manual: 16 min  TPDN (includes palpation time): glute med, piriformis, deep hip ERs         Ther-Ex: 18 min  Bike 5 min  Fig 4 LTR, x20 ea   Bridge with abd/ER, RTB, 2x10   Clams, RTB, 2x10 B  Piriformis stretch on table, 2x30\" B         NMR: 10 min  TA with BKFO and laser, RTB, 2x10  Dead bug lvl 1, RSB, 2x10 B  TA brace with fire hydrant in quadruped, RTB, 2x10               HEP: Access Code: [email or web address]  URL: [email or web address]  Date: 07/10/2024  Prepared by: Jyoti Andersen     Exercises  - Supine Posterior Pelvic Tilt  - 2 x daily - 7 x weekly - 20 reps - 5\" hold  - Supine March with Posterior Pelvic Tilt  - 2 x daily - 7 x weekly - 20 reps  - Supine Hip External Rotation Stretch  - 2 x daily - 7 x weekly - 15 reps - 3\" hold  - Supine 90/90 Sciatic Nerve Glide with Knee Flexion/Extension  - 2 x daily - 7 x weekly - 30 reps  - Clamshell  - 2 x daily - 7 x weekly - 2 sets - 10 reps  - Seated Piriformis Stretch with Trunk Bend  - 2 x daily - 7 x weekly - 3 sets - 30\" hold    Charges: 1 manual, 1 TE, 1 NMR         Total Timed Treatment: 44 min  Total Treatment Time: 45 min

## 2024-07-17 ENCOUNTER — OFFICE VISIT (OUTPATIENT)
Dept: PHYSICAL THERAPY | Age: 59
End: 2024-07-17
Attending: INTERNAL MEDICINE
Payer: COMMERCIAL

## 2024-07-17 PROCEDURE — 97140 MANUAL THERAPY 1/> REGIONS: CPT

## 2024-07-17 PROCEDURE — 97112 NEUROMUSCULAR REEDUCATION: CPT

## 2024-07-17 PROCEDURE — 97110 THERAPEUTIC EXERCISES: CPT

## 2024-07-31 ENCOUNTER — TELEPHONE (OUTPATIENT)
Dept: PHYSICAL THERAPY | Age: 59
End: 2024-07-31

## 2024-07-31 ENCOUNTER — OFFICE VISIT (OUTPATIENT)
Dept: PHYSICAL THERAPY | Age: 59
End: 2024-07-31
Attending: INTERNAL MEDICINE
Payer: COMMERCIAL

## 2024-07-31 PROCEDURE — 97110 THERAPEUTIC EXERCISES: CPT

## 2024-07-31 PROCEDURE — 97140 MANUAL THERAPY 1/> REGIONS: CPT

## 2024-07-31 NOTE — PROGRESS NOTES
Diagnosis:   Piriformis syndrome of left side (G57.02)        Referring Provider: Stephane De Luna  Date of Evaluation:    7/9/2024    Precautions:  None Next MD visit:   none scheduled  Date of Surgery: n/a   Insurance Primary/Secondary: BC[employer] POS / N/A     # Auth Visits: n/a            Subjective: States that he still has pain sitting in recliner and lying down with legs straight    Pain: 6/10 when present      Objective: Localized twitch response elicited in R deep hip ERs, R piriformis       Assessment: Pt continues to tolerate trigger point dry needling well with good twitch response elicited. Progression of hip strength through glute med and deep hip ERs to reduce overuse/compensation of the piriformis. Fatigue by the end of the session appropriately.       Goals:   (to be met in 8 visits)  Pt will improve hip ABD strength to at least 4+/5 to increase ease with standing and walking   Pt will demonstrate improved SL squat mechanics to improve lumbopelvic strength with functional activities   Pt will improve transversus abdominis recruitment to perform proper isometric contraction without requiring verbal or tactile cuing to promote advancement of therex   Pt will demonstrate good understanding of proper posture and body mechanics to decrease pain and improve spinal safety   Pt will report improved symptom centralization and absence of radicular symptoms for 3 consecutive days to improve function with ADL   Pt will have decreased paraspinal mm tension to tolerate standing >60 minutes for work and home activities   Pt will demonstrate improved core strength to be able to perform lifting with <2/10 pain   Pt will be independent and compliant with comprehensive HEP to maintain progress achieved in PT     Plan: Continue as able   Date: 7/17/2024  TX#: 2/8 Date: 7/31/2024            TX#: 3/8 Date:                 TX#: 4/ Date:                 TX#: 5/ Date:   Tx#: 6/   Manual: 16 min  TPDN (includes palpation time): glute  med, piriformis, deep hip ERs   Manual: 16 min  TPDN (includes palpation time): glute med, piriformis, deep hip ERs      Ther-Ex: 18 min  Bike 5 min  Fig 4 LTR, x20 ea   Bridge with abd/ER, RTB, 2x10   Clams, RTB, 2x10 B  Piriformis stretch on table, 2x30\" B   Ther-Ex: 30 min  Bike 5 min  Fig 4 LTR, x20 ea   Prone glute squeeze, 20x3\"   Side steps, red cuff, 2 laps length of barre  Retro walks, red cuff, 2 laps length of barre   Squat to 20\" box, RTB knees, 2x10  Nepali split squat on 20\" box, 2x10 B  TRX lateral lunges static, 2x10 B  TRX static lunge, 2x10 B  Tipping bird, 2x10 at barre   Piriformis stretch seated, 2x30\" B         NMR: 10 min  TA with BKFO and laser, RTB, 2x10  Dead bug lvl 1, RSB, 2x10 B  TA brace with fire hydrant in quadruped, RTB, 2x10  NMR: 3 min  3D kicks on Ax, 2x10 B               Patient education provided for:  indications and benefits of dry needling, post-needling care.   Patient acknowledges risks and benefits of dry needling and made an informed decision in use of TPDN with certified physical therapist. Denied use of coagulants or history of blood clotting disorders.    HEP: Access Code: [email or web address]  URL: [email or web address]  Date: 07/10/2024  Prepared by: Jyoti Andersen     Exercises  - Supine Posterior Pelvic Tilt  - 2 x daily - 7 x weekly - 20 reps - 5\" hold  - Supine March with Posterior Pelvic Tilt  - 2 x daily - 7 x weekly - 20 reps  - Supine Hip External Rotation Stretch  - 2 x daily - 7 x weekly - 15 reps - 3\" hold  - Supine 90/90 Sciatic Nerve Glide with Knee Flexion/Extension  - 2 x daily - 7 x weekly - 30 reps  - Clamshell  - 2 x daily - 7 x weekly - 2 sets - 10 reps  - Seated Piriformis Stretch with Trunk Bend  - 2 x daily - 7 x weekly - 3 sets - 30\" hold    Charges: 1 manual, 2 TE        Total Timed Treatment: 49 min  Total Treatment Time: 50 min  Needle insertion time not included in manual therapy minutes charge.

## 2024-10-13 ENCOUNTER — PATIENT MESSAGE (OUTPATIENT)
Dept: INTERNAL MEDICINE CLINIC | Facility: CLINIC | Age: 59
End: 2024-10-13

## 2024-10-14 NOTE — TELEPHONE ENCOUNTER
Dr De Luna' recommendations sent to patient  To let us know if he has further questions  Flor GERARD    Patient presents with:  left foot big toe nail problem      Clinical Decision Making:  Patient was concerned that there was a infection on the left hallux.  She is concerned that that she may lose the nail.  Patient was reassured that there was no cellulitis of the paronychia has resolved with the treatment.  Since the swelling has subsided there is base between the nailbed and the nail plate.  As long she keeps the area clean dry protected does not catch it on clothing or shoe wear she can protect the toenail.  It should grow out over the next 6 months.  And occasion for return was gone over and questions were answered to patient satisfaction for discharge      ICD-10-CM    1. Feared complaint without diagnosis  Z71.1           HPI:  Zaina Shaikh is a 62 year old female who presents today for reevaluation of the paronychia of the left hallux.  She was seen by myself in clinic on 8/28/2024.  Please see note in epic for specifics.  In summary, patient was diagnosed with paronychia of the left hallux in the setting of diabetes mellitus type 2 and was placed on cefadroxil.  Patient completed the course of the antibiotic.  Had good movement of the redness and swelling.  She was concerned that there is now slightly raised the nail above the nailbed of the left hallux.  She has not had redness pain or drainage.  In fact she states her symptoms have markedly improved but was concerned about the nail or losing the nail.    History obtained from chart review and the patient.    Problem List:  2024-01: Urinary urgency  2022-09: Advanced directives, counseling/discussion  2022-05: BP check  2021-09: Primary osteoarthritis of both hips  2021-09: Post-void dribbling  2021-09: Chronic left-sided low back pain with left-sided sciatica  2019-06: Family history of atherosclerosis  2018-10: DELL (obstructive sleep apnea)  2018-10: Class 2 severe obesity due to excess calories with serious   comorbidity and body mass index (BMI) of  38.0 to 38.9 in adult (H)  2017: Biceps tendinopathy, right  2017: Hot flashes  2016: Type 2 diabetes mellitus without complication (H)  Osteoarthrosis Of The Knee  Vitamin D Deficiency  Hyperlipidemia  Hearing Loss  Benign Essential Hypertension  Heartburn  Type 2 diabetes mellitus without complication, without long-term   current use of insulin (H)      Past Medical History:   Diagnosis Date    Arthritis     Diabetes (H)     Hypertension     Nicotine abuse 2015    Sleep apnea        Social History     Tobacco Use    Smoking status: Former     Current packs/day: 0.00     Average packs/day: 0.5 packs/day for 32.9 years (16.5 ttl pk-yrs)     Types: Cigarettes     Start date: 1983     Quit date: 2015     Years since quittin.7     Passive exposure: Past    Smokeless tobacco: Former     Quit date: 2015   Substance Use Topics    Alcohol use: Yes     Comment: a couple times a year       Review of Systems  As above in HPI otherwise negative.    Vitals:    09/15/24 1209   BP: (!) 143/69   Pulse: 80   Resp: 19   Temp: 98.3  F (36.8  C)   SpO2: 97%   Weight: 106.6 kg (235 lb)       General: Patient is resting comfortably no acute distress is afebrile  HEENT: Head is normocephalic atraumatic   eyes are PERRL EOMI sclera anicteric   Skin: Without rash non-diaphoretic  Musculoskeletal:  Inspection of the left hallux shows that the redness and swelling of the tissues have resolved.  There is dystrophic fungal nail changes on the nail.  There is slight elevation of the nail on the distal end of the nail since the swelling has gone down.  The nail still in place and is not loose.    Physical Exam        At the end of the encounter, I discussed results, diagnosis, medications. Discussed red flags for immediate return to clinic/ER, as well as indications for follow up if no improvement. Patient understood and agreed to plan. Patient was stable for discharge.

## 2024-10-14 NOTE — TELEPHONE ENCOUNTER
Glad to hear he's up to date of the seasonal vaccines.  Of note, the RSV was just a one time vaccine.  He'll never need it again unless the guidelines changed.  He had his last Boostrix in 2017, which covers for Pertussis.  No indication for pneumonia vaccine until he turns 67 or develops a chronic conditions such as diabetes, lung disease, heart disease, kidney disease, liver disease, or any sort of immunocompromised.       Stephane De Luna MD  [facility] Physician  Diplomate, American Board of Internal Medicine  Member, American College of Lifestyle Medicine  Member, American Association for Physician [address], [address] [address]  [phone] (phone); [phone] (fax)  [email or web address]

## 2024-10-25 DIAGNOSIS — I51.5 CARDIAC CALCIFICATION (HCC): ICD-10-CM

## 2024-10-25 DIAGNOSIS — E78.00 HYPERCHOLESTEROLEMIA: ICD-10-CM

## 2024-10-25 RX ORDER — ROSUVASTATIN CALCIUM 10 MG/1
10 TABLET, COATED ORAL NIGHTLY
Qty: 90 TABLET | Refills: 3 | Status: SHIPPED | OUTPATIENT
Start: 2024-10-25

## 2024-10-25 NOTE — TELEPHONE ENCOUNTER
Medication: Rosuvastatin 10 mg 1 tab nightly filled     Last time it was filled 10/27/23 90 with 3 refills   Last office visit:  6/20/24  Due back to office:  RTC 1 year or as needed.   Future office visit:  No upcoming apt on file   Labs   5/28/24    Cholesterol, Total  mg/dL 130   HDL Cholesterol  mg/dL 46   Direct LDL  mg/dL 64   Triglycerides  mg/dL 118   Chol/HDL Ratio 2.80   Non HDL Chol  mg/dL 84

## 2025-01-14 ENCOUNTER — MED REC SCAN ONLY (OUTPATIENT)
Dept: INTERNAL MEDICINE CLINIC | Facility: CLINIC | Age: 60
End: 2025-01-14

## 2025-01-24 DIAGNOSIS — E78.00 HYPERCHOLESTEROLEMIA: ICD-10-CM

## 2025-01-24 DIAGNOSIS — I51.5 CARDIAC CALCIFICATION (HCC): ICD-10-CM

## 2025-01-24 RX ORDER — ROSUVASTATIN CALCIUM 10 MG/1
10 TABLET, COATED ORAL NIGHTLY
Qty: 90 TABLET | Refills: 3 | Status: SHIPPED | OUTPATIENT
Start: 2025-01-24

## 2025-01-24 NOTE — TELEPHONE ENCOUNTER
Medication: Rosuvastatin 10 mg, take 1 tab nightly.     Last time it was filled: 10/25/24 90 tabs 3 refills  Last office visit:  6/20/24  Due back to office: 6/20/25  Future office visit:  no upcoming apt   Labs   Cholesterol, Total  mg/dL 130   HDL Cholesterol  mg/dL 46   Direct LDL  mg/dL 64   Triglycerides  mg/dL 118   Chol/HDL Ratio 2.80   Non HDL Chol  mg/dL 84     BUN  mg/dL 11   Bilirubin, Total  mg/dL 0.4   SODIUM  136 - 145 mmol/L 141   POTASSIUM  3.5 - 5.1 mmol/L 3.9   AST  10 - 35 U/L 15   ALT  9 - 46 U/L 23   CHLORIDE 102   CO2 25   CALCIUM 9.4   TP  6.1 - 8.0 6.5      Refilled per protocol

## 2025-02-10 ENCOUNTER — PATIENT MESSAGE (OUTPATIENT)
Dept: INTERNAL MEDICINE CLINIC | Facility: CLINIC | Age: 60
End: 2025-02-10

## 2025-02-11 ENCOUNTER — TELEMEDICINE (OUTPATIENT)
Dept: INTERNAL MEDICINE CLINIC | Facility: CLINIC | Age: 60
End: 2025-02-11
Payer: COMMERCIAL

## 2025-02-11 DIAGNOSIS — M54.81 OCCIPITAL NEURALGIA OF RIGHT SIDE: Primary | ICD-10-CM

## 2025-02-11 PROCEDURE — 98005 SYNCH AUDIO-VIDEO EST LOW 20: CPT | Performed by: INTERNAL MEDICINE

## 2025-02-11 RX ORDER — METHYLPREDNISOLONE 4 MG/1
TABLET ORAL
Qty: 21 EACH | Refills: 0 | Status: SHIPPED | OUTPATIENT
Start: 2025-02-11

## 2025-02-11 NOTE — TELEPHONE ENCOUNTER
Please set up patient for virtual visit.  This could be either via video or telephone.       Stephane De Luna MD  [facility] Physician  Diplomate, American Board of Internal Medicine  Member, American College of Lifestyle Medicine  Member, American Association for Physician [address], [address] [id number] [phone] (phone); [phone] (fax)  [email or web address]

## 2025-02-11 NOTE — TELEPHONE ENCOUNTER
Called Patient.  He is agreeable to video visit:  Future Appointments   Date Time Provider Department Center   2/11/2025  9:45 AM Stephane De Luna MD [facility]

## 2025-02-11 NOTE — TELEPHONE ENCOUNTER
TC to Patient ([phone])     Reports intermittent but mostly constant pain for the last couple of days behind R ear on scalp   Describes as a sharp pain/needling    Reports hx HAs but this hasn't worsened or changed  Denies vision changes, dizziness/lightheadedness, weakness, changes to ROM    Tried Extra Strength Tylenol with minimal to no relief    Is agreeable to appt but needs a late day visit.  Nothing available this week    OK to respond back with MyChart or phone call    Please advise

## 2025-02-11 NOTE — PROGRESS NOTES
The 21st Century Cures Act makes medical notes like these available to patients in the interest of transparency. Please be advised this is a medical document. Medical documents are intended to carry relevant information, facts as evident, and the clinical opinion of the practitioner. The medical note is intended as peer to peer communication and may appear blunt or direct. It is written in medical language and may contain abbreviations or verbiage that are unfamiliar.           This is a telemedicine visit with live, interactive video and audio.     Patient understands and accepts financial responsibility for any deductible, co-insurance and/or co-pays associated with this service.    Chief Complaint   Patient presents with    Headache        SUBJECTIVE  HPI: Brian presents today for evaluation of right sided temporoparietal, sharp and stabbing headache.  Onset this several days.  Symptoms began concomitantly with what may have been some URI symptoms.  Of note, he is taking amoxicillin which was prescribed by his ENT doc and began 2 days ago for a specific ENT-related diagnosis.  The sensation that he feels currently in the temporoparietal region is of at least moderate severity, nonradiating, and is not associated with any overlying skin changes.  He does report chronic neck pain and stiffness.  He does have a known history of migraines, approximately experiencing a single episode per month, and he denies any visual symptoms.  He did try taking extreme Tylenol which did not help enough.  He is here for next steps in management.    Review of Systems   All other systems reviewed and are negative.    Patient Active Problem List   Diagnosis    Pain of multiple sites    Chronic migraine without aura without status migrainosus, not intractable    Cardiac calcification (HCC) - CAC score of 56.51 on 10/20/22 CT Heart Scan protocol    Class 1 obesity due to excess calories without serious comorbidity with body mass index  (BMI) of 31.0 to 31.9 in adult    Hypercholesterolemia    Weakness of both hands    PERRY (obstructive sleep apnea) - severe and [facility]    Piriformis syndrome of left side        Allergies[1]   Current Outpatient Medications   Medication Sig Dispense Refill    rosuvastatin 10 MG Oral Tab Take 1 tablet (10 mg total) by mouth nightly. 90 tablet 3    CYCLOBENZAPRINE 5 MG Oral Tab TAKE 1 TO 2 TABLETS(5 TO 10 MG) BY MOUTH THREE TIMES DAILY AS NEEDED FOR MUSCLE SPASMS 20 tablet 0    omega-3 fatty acids 1000 MG Oral Cap Take 1,000 mg by mouth daily.      ibuprofen 400 MG Oral Tab Take 1 tablet (400 mg total) by mouth every 6 (six) hours as needed for Pain.      Probiotic Product (PROBIOTIC DAILY) Oral Cap Align probiotic once a day       Multiple Vitamin (DAILY VALUE MULTIVITAMIN) Oral Tab Take 1 tablet by mouth daily.       Social History     Socioeconomic History    Marital status:    Tobacco Use    Smoking status: Former     Current packs/day: 0.00     Types: Cigarettes     Quit date:      Years since quittin.    Smokeless tobacco: Never   Vaping Use    Vaping status: Never Used   Substance and Sexual Activity    Alcohol use: Not Currently    Drug use: No    Sexual activity: Yes     Partners: Female   Other Topics Concern    Caffeine Concern Yes     Comment: 16 oz of coffee per day     Stress Concern Yes    Weight Concern Yes    Special Diet No    Exercise Yes     Comment: 3x/week    Seat Belt Yes       OBJECTIVE  Physical Exam:   alert, appears stated age, and cooperative, Normocephalic, without obvious abnormality, atraumatic, Speaking in full sentences comfortably, Normal work of breathing, and Skin color, texture, turgor normal. No rashes or lesions at the visualized scalp via the camera.      ASSESSMENT & PLAN  Encounter Diagnosis   Name Primary?    Occipital neuralgia of right side Yes     Likely diagnosis of occipital neuralgia of the right side.   Pathophysiology was discussed.  I would start him on a Medrol Dosepak to see if this does any effect.  I also encouraged him to follow-up with his chiropractor for updated adjustments.  If he were to continue with symptoms lasting into next week, then I would consider imaging modalities such as CT scan of the spine that would suggest underlying disorders of the spine itself that can lead to pinching of nerves or overlying muscle tightness.  He will keep us updated on his progress.  RTC as needed or at next regularly scheduled office visit.  He verbally agrees to and understands the plan as outlined above.  He was also afforded the time and opportunity to ask questions, which were then answered to the best of my ability.        Stephane De Luna MD  [facility] Physician  Diplomate, American Board of Internal Medicine  Member, American College of Lifestyle Medicine  Member, American Association for Physician Leadership  [address], [address], [place], [place] [id number] [phone] (phone); [phone] (fax)  [email or web address]         Meds & Refills for this Visit:  Requested Prescriptions     Signed Prescriptions Disp Refills    methylPREDNISolone (MEDROL) 4 MG Oral Tablet Therapy Pack 21 each 0     Sig: As directed.       Imaging & Consults:  None         [1]   Allergies  Allergen Reactions    Zyban [Bupropion Hcl] HIVES     TBCR

## 2025-04-04 ENCOUNTER — DOCUMENTATION ONLY (OUTPATIENT)
Facility: CLINIC | Age: 60
End: 2025-04-04

## 2025-06-09 ENCOUNTER — DOCUMENTATION ONLY (OUTPATIENT)
Facility: CLINIC | Age: 60
End: 2025-06-09

## 2025-07-10 ENCOUNTER — LAB ENCOUNTER (OUTPATIENT)
Dept: LAB | Age: 60
End: 2025-07-10
Attending: INTERNAL MEDICINE
Payer: COMMERCIAL

## 2025-07-10 ENCOUNTER — TELEPHONE (OUTPATIENT)
Facility: CLINIC | Age: 60
End: 2025-07-10

## 2025-07-10 ENCOUNTER — NURSE ONLY (OUTPATIENT)
Facility: CLINIC | Age: 60
End: 2025-07-10
Payer: COMMERCIAL

## 2025-07-10 DIAGNOSIS — R69 DIAGNOSIS UNKNOWN: Primary | ICD-10-CM

## 2025-07-10 LAB
AMB EXT BILIRUBIN, TOTAL: 0.7 MG/DL
AMB EXT BUN: 12 MG/DL
AMB EXT CALCIUM: 9.6
AMB EXT CARBON DIOXIDE: 23
AMB EXT CHLORIDE: 101
AMB EXT CHOL/HDL RATIO: 3.2
AMB EXT CHOLESTEROL, TOTAL: 162 MG/DL
AMB EXT CMP ALT: 26 U/L (ref 9–46)
AMB EXT CMP AST: 29 U/L (ref 10–35)
AMB EXT CREATININE: 0.77 MG/DL
AMB EXT EGFR-AA: 104
AMB EXT GLUCOSE: 74 MG/DL
AMB EXT HDL CHOLESTEROL: 51 MG/DL
AMB EXT HEMATOCRIT: 51.7 (ref 38.5–50)
AMB EXT HEMOGLOBIN: 17.2 (ref 13.2–17.1)
AMB EXT HGBA1C: 5.4 %
AMB EXT LDL CHOLESTEROL, DIRECT: 92 MG/DL
AMB EXT MCV: 90.7 (ref 80–100)
AMB EXT NON HDL CHOL: 111 MG/DL
AMB EXT PLATELETS: 210 (ref 140–400)
AMB EXT POSTASSIUM: 4.3 MMOL/L (ref 3.5–5.1)
AMB EXT PSA SCREEN: 0.3 NG/ML
AMB EXT SODIUM: 140 MMOL/L (ref 136–145)
AMB EXT TOTAL PROTEIN: 7.5 (ref 6.1–8)
AMB EXT TRIGLYCERIDES: 98 MG/DL
AMB EXT TSH: 1.94 MIU/ML
AMB EXT WBC: 7.1 X10(3)UL

## 2025-07-10 NOTE — TELEPHONE ENCOUNTER
[facility]  Request was faxed to [facility] on 7/10/25. Confirmation received.

## 2025-07-31 ENCOUNTER — OFFICE VISIT (OUTPATIENT)
Facility: CLINIC | Age: 60
End: 2025-07-31
Payer: COMMERCIAL

## 2025-07-31 VITALS
DIASTOLIC BLOOD PRESSURE: 76 MMHG | OXYGEN SATURATION: 96 % | RESPIRATION RATE: 16 BRPM | BODY MASS INDEX: 32.9 KG/M2 | TEMPERATURE: 98 F | HEIGHT: 63.5 IN | WEIGHT: 188 LBS | HEART RATE: 75 BPM | SYSTOLIC BLOOD PRESSURE: 108 MMHG

## 2025-07-31 DIAGNOSIS — E66.09 CLASS 1 OBESITY DUE TO EXCESS CALORIES WITH SERIOUS COMORBIDITY AND BODY MASS INDEX (BMI) OF 32.0 TO 32.9 IN ADULT: ICD-10-CM

## 2025-07-31 DIAGNOSIS — I25.10 CORONARY ARTERY DISEASE INVOLVING NATIVE CORONARY ARTERY OF NATIVE HEART WITHOUT ANGINA PECTORIS: ICD-10-CM

## 2025-07-31 DIAGNOSIS — I51.5 CARDIAC CALCIFICATION (HCC): ICD-10-CM

## 2025-07-31 DIAGNOSIS — R10.31 RIGHT INGUINAL PAIN: ICD-10-CM

## 2025-07-31 DIAGNOSIS — R25.1 INTERMITTENT TREMOR: ICD-10-CM

## 2025-07-31 DIAGNOSIS — R00.2 INTERMITTENT PALPITATIONS: ICD-10-CM

## 2025-07-31 DIAGNOSIS — M54.2 NECK PAIN ON LEFT SIDE: ICD-10-CM

## 2025-07-31 DIAGNOSIS — Z00.00 ROUTINE GENERAL MEDICAL EXAMINATION AT A HEALTH CARE FACILITY: Primary | ICD-10-CM

## 2025-07-31 DIAGNOSIS — E66.811 CLASS 1 OBESITY DUE TO EXCESS CALORIES WITH SERIOUS COMORBIDITY AND BODY MASS INDEX (BMI) OF 32.0 TO 32.9 IN ADULT: ICD-10-CM

## 2025-07-31 PROBLEM — M21.42 PES PLANUS OF BOTH FEET: Status: ACTIVE | Noted: 2025-07-31

## 2025-07-31 PROBLEM — G57.02 PIRIFORMIS SYNDROME OF LEFT SIDE: Status: RESOLVED | Noted: 2024-06-20 | Resolved: 2025-07-31

## 2025-07-31 PROBLEM — M21.41 PES PLANUS OF BOTH FEET: Status: ACTIVE | Noted: 2025-07-31

## 2025-07-31 LAB
ATRIAL RATE: 76 BPM
BILIRUB UR QL STRIP.AUTO: NEGATIVE
CLARITY UR REFRACT.AUTO: CLEAR
GLUCOSE UR STRIP.AUTO-MCNC: NORMAL MG/DL
KETONES UR STRIP.AUTO-MCNC: NEGATIVE MG/DL
LEUKOCYTE ESTERASE UR QL STRIP.AUTO: NEGATIVE
NITRITE UR QL STRIP.AUTO: NEGATIVE
P AXIS: 33 DEGREES
P-R INTERVAL: 180 MS
PH UR STRIP.AUTO: 6.5 (ref 5–8)
PROT UR STRIP.AUTO-MCNC: NEGATIVE MG/DL
Q-T INTERVAL: 372 MS
QRS DURATION: 92 MS
QTC CALCULATION (BEZET): 418 MS
R AXIS: 10 DEGREES
RBC UR QL AUTO: NEGATIVE
SP GR UR STRIP.AUTO: 1 (ref 1–1.03)
T AXIS: 24 DEGREES
UROBILINOGEN UR STRIP.AUTO-MCNC: NORMAL MG/DL
VENTRICULAR RATE: 76 BPM

## 2025-07-31 PROCEDURE — 3074F SYST BP LT 130 MM HG: CPT | Performed by: INTERNAL MEDICINE

## 2025-07-31 PROCEDURE — 99396 PREV VISIT EST AGE 40-64: CPT | Performed by: INTERNAL MEDICINE

## 2025-07-31 PROCEDURE — 3008F BODY MASS INDEX DOCD: CPT | Performed by: INTERNAL MEDICINE

## 2025-07-31 PROCEDURE — 92551 PURE TONE HEARING TEST AIR: CPT | Performed by: INTERNAL MEDICINE

## 2025-07-31 PROCEDURE — 99173 VISUAL ACUITY SCREEN: CPT | Performed by: INTERNAL MEDICINE

## 2025-07-31 PROCEDURE — 93923 UPR/LXTR ART STDY 3+ LVLS: CPT | Performed by: INTERNAL MEDICINE

## 2025-07-31 PROCEDURE — 3078F DIAST BP <80 MM HG: CPT | Performed by: INTERNAL MEDICINE

## 2025-07-31 PROCEDURE — 81003 URINALYSIS AUTO W/O SCOPE: CPT | Performed by: INTERNAL MEDICINE

## 2025-07-31 PROCEDURE — 93000 ELECTROCARDIOGRAM COMPLETE: CPT | Performed by: INTERNAL MEDICINE

## 2025-07-31 RX ORDER — FEXOFENADINE HCL 60 MG/1
60 TABLET, FILM COATED ORAL DAILY
COMMUNITY

## 2025-08-12 RX ORDER — CYCLOBENZAPRINE HCL 5 MG
TABLET ORAL 3 TIMES DAILY PRN
Qty: 20 TABLET | Refills: 0 | Status: SHIPPED | OUTPATIENT
Start: 2025-08-12

## (undated) NOTE — ED AVS SNAPSHOT
[facility] in [address] [place] [address] Drive,[address]    [address] [place] [address]    Phone:  [phone]    Fax:  [phone]   MRN: [id number]    Department:  [facility]   Date of Visit:  4/6/201 any antibiotic prescription you begin. Please monitor for expanding redness, fever, chills, purulent drainage and worsening pain. If the symptoms occur, please return promptly.     Discharge References/Attachments     CAT BITE (ENGLISH)      Disclosure care or specialist physician will see patients referred from the [facility]. Follow-up care is at the discretion of that Physician.     IF THERE IS ANY CHANGE OR WORSENING OF YOUR CONDITION, CALL YOUR PRIMARY CARE PHYSICIAN AT ONCE OR GO TO THE harming yourself, contact [facility] at [phone]. - If you don’t have insurance, Drew Trujillo has partnered with Patient [address] to help you get signed up for insurance coverage.   Patient [place]

## (undated) NOTE — LETTER
08/29/19        [address] [facility] [address]      Dear Lovelace Solders,    [facility] records indicate that you have outstanding lab work and or testing that was ordered for you and has not yet been completed: Fasting lab work   *fast

## (undated) NOTE — LETTER
10/31/18        Skye Guzmáno  [address] [place] [address]      Dear [facility],    [address] records indicate that you have outstanding lab work and or testing that was ordered for you and has not yet been completed:  Orders Placed This Encou

## (undated) NOTE — Clinical Note
Chad Montes.  This is a new patient who I am hoping that you are able to see with, surprisingly, left-sided piriformis syndrome.  I told him about my story dealing with this condition and starting on a pathway of therapy and recovery.  He is very much interested in pursuing therapy with you in the treatment of this diagnosis.  Please let me know if you need anything else to make this connection happen.  Kind regards, Dr. MONTENEGRO